# Patient Record
Sex: FEMALE | Race: BLACK OR AFRICAN AMERICAN | NOT HISPANIC OR LATINO | URBAN - METROPOLITAN AREA
[De-identification: names, ages, dates, MRNs, and addresses within clinical notes are randomized per-mention and may not be internally consistent; named-entity substitution may affect disease eponyms.]

---

## 2017-02-20 ENCOUNTER — EMERGENCY (EMERGENCY)
Facility: HOSPITAL | Age: 62
LOS: 1 days | Discharge: HOME | End: 2017-02-20
Attending: EMERGENCY MEDICINE
Payer: COMMERCIAL

## 2017-02-20 VITALS
SYSTOLIC BLOOD PRESSURE: 192 MMHG | WEIGHT: 293 LBS | HEART RATE: 95 BPM | OXYGEN SATURATION: 98 % | RESPIRATION RATE: 22 BRPM | DIASTOLIC BLOOD PRESSURE: 88 MMHG | HEIGHT: 65 IN | TEMPERATURE: 98 F

## 2017-02-20 DIAGNOSIS — Z88.8 ALLERGY STATUS TO OTHER DRUGS, MEDICAMENTS AND BIOLOGICAL SUBSTANCES STATUS: ICD-10-CM

## 2017-02-20 DIAGNOSIS — S93.401A SPRAIN OF UNSPECIFIED LIGAMENT OF RIGHT ANKLE, INITIAL ENCOUNTER: ICD-10-CM

## 2017-02-20 DIAGNOSIS — S83.401A SPRAIN OF UNSPECIFIED COLLATERAL LIGAMENT OF RIGHT KNEE, INITIAL ENCOUNTER: ICD-10-CM

## 2017-02-20 DIAGNOSIS — I10 ESSENTIAL (PRIMARY) HYPERTENSION: ICD-10-CM
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
## 2017-02-20 DIAGNOSIS — E11.9 TYPE 2 DIABETES MELLITUS WITHOUT COMPLICATIONS: ICD-10-CM

## 2017-02-20 PROCEDURE — 99284 EMERGENCY DEPT VISIT MOD MDM: CPT

## 2017-02-20 PROCEDURE — 73590 X-RAY EXAM OF LOWER LEG: CPT | Mod: 26,RT

## 2017-02-20 PROCEDURE — 73610 X-RAY EXAM OF ANKLE: CPT | Mod: 26,RT

## 2017-02-20 PROCEDURE — 73564 X-RAY EXAM KNEE 4 OR MORE: CPT | Mod: 26,RT

## 2017-02-20 RX ORDER — LIDOCAINE HCL 20 MG/ML
5 VIAL (ML) INJECTION ONCE
Qty: 0 | Refills: 0 | Status: COMPLETED | OUTPATIENT
Start: 2017-02-20 | End: 2017-02-20

## 2017-02-20 RX ORDER — KETOROLAC TROMETHAMINE 30 MG/ML
30 SYRINGE (ML) INJECTION ONCE
Qty: 0 | Refills: 0 | Status: DISCONTINUED | OUTPATIENT
Start: 2017-02-20 | End: 2017-02-20

## 2017-02-20 RX ORDER — ACETAMINOPHEN 500 MG
2 TABLET ORAL
Qty: 60 | Refills: 0 | OUTPATIENT
Start: 2017-02-20 | End: 2017-02-25

## 2017-02-20 RX ADMIN — Medication 5 MILLILITER(S): at 12:27

## 2017-02-20 RX ADMIN — Medication 30 MILLIGRAM(S): at 12:39

## 2017-02-20 RX ADMIN — Medication 80 MILLIGRAM(S): at 12:28

## 2017-02-20 RX ADMIN — Medication 30 MILLIGRAM(S): at 12:32

## 2017-02-20 NOTE — ED ADULT NURSE NOTE - OBJECTIVE STATEMENT
Presented to ED s/p fall from bed this morning and "hurt right leg." Complains of pain from knee down to ankle. AA&Ox3. Breathing on room air. Denies hitting head or LOC.

## 2017-02-20 NOTE — ED PROVIDER NOTE - PHYSICAL EXAMINATION
GENERAL: No acute distress, non toxic  HEAD: Atraumatic, normocephalic  EARS: Externally normal, atraumatic, TMs normal bilaterally  EYES: No jaundice, not injected, no rupture, no foreign bodies  MOUTH: Moist mucous membranes, no open lesion, uvula midline without edema, no exudates, no peritonsilar abscess bilaterally.  NECK: Supple, full range of motion, no swelling, no lymphadenopathy  HEART: Regular rate and rhythm, no murmurs, no rubs, no gallops  LUNGS: Clear to auscultation bilaterally without rhonci, rales, or wheezing  ABDOMEN: Soft and non tender in all 4 quadrants, normal bowel sounds, no signs of trauma, no costovertebral tenderness bilaterally  BACK/SPINE: Non tender spine in cervical/thoracic/lumbar regions, no stepoffs palpable  EXTREMITIES: No gross deformities; RLE decreased ROM at knee and ankle secondary to pain, pulses palpable;  no edema; no ecchymosis; no signs of trauma   VASCULAR: Pulses palpable in all extremities, no pitting edema, capillary refill <2 secs  SKIN: Grossly intact without rash or open wounds  PSYCH: Alert and oriented x 3  GAIT: Normal without need for assistance

## 2017-02-20 NOTE — ED PROVIDER NOTE - MEDICAL DECISION MAKING DETAILS
Pt with RLE pain s/p fall out of bed. Plan to obtain imaging, give pain medication and reassess. Pt with RLE pain s/p fall out of bed. Plan to obtain imaging, give pain medication and reassess.  XR neg for Fx. Saw Ortho in ED (see procedure and consult note). Pain improved. Knee immobilizer and ACE wrap was placed by Ortho and will f/u with Ortho outpt. Pain meds Rx PRN. Return for worsening symptoms.

## 2017-02-20 NOTE — ED PROVIDER NOTE - NS ED MD SCRIBE ATTENDING SCRIBE SECTIONS
VITAL SIGNS( Pullset)/PHYSICAL EXAM/HIV/PAST MEDICAL/SURGICAL/SOCIAL HISTORY/DISPOSITION/HISTORY OF PRESENT ILLNESS/REVIEW OF SYSTEMS

## 2017-02-20 NOTE — ED PROVIDER NOTE - CONDUCTED A DETAILED DISCUSSION WITH PATIENT AND/OR GUARDIAN REGARDING, MDM
return to ED if symptoms worsen, persist or questions arise need for outpatient follow-up/return to ED if symptoms worsen, persist or questions arise/radiology results

## 2017-02-20 NOTE — ED PROVIDER NOTE - OBJECTIVE STATEMENT
62 y/o F pt with PMHx of obesity, HTN and DM presents to the ED from home c/o R knee and ankle pain s/p fall from bed x today. Pt reports she is ambulatory at baseline. Pt states she heard a pop after she fell and was unable to bear weight. Pt's daughter had to help her get up. Pt took blood pressure medication PTA. Pt denies head trauma. LOC, CP, dizziness, SOB, paresthesia, or any other complaints at this time. Multiple Allergies please follow up arranged by [facility] referral coordinator. to chart. 62 y/o F pt with PMHx of obesity, HTN and DM presents to the ED from home c/o R knee and ankle pain s/p fall from bed x today. Pt reports she is ambulatory at baseline. Pt states she heard a pop after she fell and was unable to bear weight. Pt's daughter had to help her get up. Pt took blood pressure medication PTA. Pt denies head trauma. LOC, CP, dizziness, SOB, paresthesia, or any other complaints at this time. Has been well otherwise without URI symptoms, no dysuria/hematuria, urgency. No travel, no dizziness, no nausea, vomiting, diarrhea. Multiple Allergies.

## 2017-02-20 NOTE — ED ADULT NURSE NOTE - CAS EDN DISCHARGE ASSESSMENT
Symptoms improved/Alert and oriented to person, place and time/Awake/No adverse reaction to first time med in ED

## 2017-02-20 NOTE — ED PROVIDER NOTE - CARE PLAN
Principal Discharge DX:	Sprain of collateral ligament of right knee, initial encounter  Secondary Diagnosis:	Sprain of right ankle, unspecified ligament, initial encounter

## 2017-03-30 ENCOUNTER — RESULT REVIEW (OUTPATIENT)
Age: 62
End: 2017-03-30

## 2017-04-05 ENCOUNTER — APPOINTMENT (OUTPATIENT)
Dept: OBGYN | Facility: CLINIC | Age: 62
End: 2017-04-05

## 2017-04-12 ENCOUNTER — EMERGENCY (EMERGENCY)
Facility: HOSPITAL | Age: 62
LOS: 1 days | Discharge: HOME | End: 2017-04-12
Attending: EMERGENCY MEDICINE
Payer: COMMERCIAL

## 2017-04-12 VITALS
SYSTOLIC BLOOD PRESSURE: 130 MMHG | RESPIRATION RATE: 20 BRPM | HEART RATE: 87 BPM | DIASTOLIC BLOOD PRESSURE: 57 MMHG | TEMPERATURE: 98 F | HEIGHT: 63 IN | OXYGEN SATURATION: 97 % | WEIGHT: 293 LBS

## 2017-04-12 VITALS
OXYGEN SATURATION: 100 % | DIASTOLIC BLOOD PRESSURE: 53 MMHG | SYSTOLIC BLOOD PRESSURE: 135 MMHG | RESPIRATION RATE: 19 BRPM | TEMPERATURE: 98 F | HEART RATE: 71 BPM

## 2017-04-12 LAB
ALBUMIN SERPL ELPH-MCNC: 3.4 G/DL — LOW (ref 3.5–5)
ALP SERPL-CCNC: 92 U/L — SIGNIFICANT CHANGE UP (ref 40–120)
ALT FLD-CCNC: 37 U/L DA — SIGNIFICANT CHANGE UP (ref 10–60)
ANION GAP SERPL CALC-SCNC: 9 MMOL/L — SIGNIFICANT CHANGE UP (ref 5–17)
AST SERPL-CCNC: 32 U/L — SIGNIFICANT CHANGE UP (ref 10–40)
BASOPHILS # BLD AUTO: 0.1 K/UL — SIGNIFICANT CHANGE UP (ref 0–0.2)
BASOPHILS NFR BLD AUTO: 1.5 % — SIGNIFICANT CHANGE UP (ref 0–2)
BILIRUB SERPL-MCNC: 0.4 MG/DL — SIGNIFICANT CHANGE UP (ref 0.2–1.2)
BUN SERPL-MCNC: 17 MG/DL — SIGNIFICANT CHANGE UP (ref 7–18)
CALCIUM SERPL-MCNC: 9.1 MG/DL — SIGNIFICANT CHANGE UP (ref 8.4–10.5)
CHLORIDE SERPL-SCNC: 105 MMOL/L — SIGNIFICANT CHANGE UP (ref 96–108)
CO2 SERPL-SCNC: 27 MMOL/L — SIGNIFICANT CHANGE UP (ref 22–31)
CREAT SERPL-MCNC: 1.18 MG/DL — SIGNIFICANT CHANGE UP (ref 0.5–1.3)
CRP SERPL-MCNC: 1.8 MG/DL — HIGH (ref 0–0.4)
EOSINOPHIL # BLD AUTO: 0.2 K/UL — SIGNIFICANT CHANGE UP (ref 0–0.5)
EOSINOPHIL NFR BLD AUTO: 2.2 % — SIGNIFICANT CHANGE UP (ref 0–6)
ERYTHROCYTE [SEDIMENTATION RATE] IN BLOOD: 53 MM/HR — HIGH (ref 0–20)
GLUCOSE SERPL-MCNC: 121 MG/DL — HIGH (ref 70–99)
HCT VFR BLD CALC: 42 % — SIGNIFICANT CHANGE UP (ref 34.5–45)
HGB BLD-MCNC: 13.6 G/DL — SIGNIFICANT CHANGE UP (ref 11.5–15.5)
LYMPHOCYTES # BLD AUTO: 3 K/UL — SIGNIFICANT CHANGE UP (ref 1–3.3)
LYMPHOCYTES # BLD AUTO: 31.8 % — SIGNIFICANT CHANGE UP (ref 13–44)
MCHC RBC-ENTMCNC: 27.2 PG — SIGNIFICANT CHANGE UP (ref 27–34)
MCHC RBC-ENTMCNC: 32.3 GM/DL — SIGNIFICANT CHANGE UP (ref 32–36)
MCV RBC AUTO: 84.3 FL — SIGNIFICANT CHANGE UP (ref 80–100)
MONOCYTES # BLD AUTO: 0.6 K/UL — SIGNIFICANT CHANGE UP (ref 0–0.9)
MONOCYTES NFR BLD AUTO: 6.2 % — SIGNIFICANT CHANGE UP (ref 2–14)
NEUTROPHILS # BLD AUTO: 5.5 K/UL — SIGNIFICANT CHANGE UP (ref 1.8–7.4)
NEUTROPHILS NFR BLD AUTO: 58.3 % — SIGNIFICANT CHANGE UP (ref 43–77)
PLATELET # BLD AUTO: 268 K/UL — SIGNIFICANT CHANGE UP (ref 150–400)
POTASSIUM SERPL-MCNC: 4.3 MMOL/L — SIGNIFICANT CHANGE UP (ref 3.5–5.3)
POTASSIUM SERPL-SCNC: 4.3 MMOL/L — SIGNIFICANT CHANGE UP (ref 3.5–5.3)
PROT SERPL-MCNC: 8.9 G/DL — HIGH (ref 6–8.3)
RBC # BLD: 4.98 M/UL — SIGNIFICANT CHANGE UP (ref 3.8–5.2)
RBC # FLD: 14.9 % — HIGH (ref 10.3–14.5)
SODIUM SERPL-SCNC: 141 MMOL/L — SIGNIFICANT CHANGE UP (ref 135–145)
WBC # BLD: 9.5 K/UL — SIGNIFICANT CHANGE UP (ref 3.8–10.5)
WBC # FLD AUTO: 9.5 K/UL — SIGNIFICANT CHANGE UP (ref 3.8–10.5)

## 2017-04-12 PROCEDURE — 93970 EXTREMITY STUDY: CPT | Mod: 26

## 2017-04-12 PROCEDURE — 73610 X-RAY EXAM OF ANKLE: CPT | Mod: 26,RT

## 2017-04-12 PROCEDURE — 99284 EMERGENCY DEPT VISIT MOD MDM: CPT

## 2017-04-12 RX ORDER — ACETAMINOPHEN 500 MG
3 TABLET ORAL
Qty: 90 | Refills: 0 | OUTPATIENT
Start: 2017-04-12 | End: 2017-04-22

## 2017-04-12 RX ORDER — KETOROLAC TROMETHAMINE 30 MG/ML
15 SYRINGE (ML) INJECTION ONCE
Qty: 0 | Refills: 0 | Status: DISCONTINUED | OUTPATIENT
Start: 2017-04-12 | End: 2017-04-12

## 2017-04-12 RX ADMIN — Medication 100 MILLIGRAM(S): at 14:49

## 2017-04-12 RX ADMIN — Medication 15 MILLIGRAM(S): at 12:50

## 2017-04-12 RX ADMIN — Medication 15 MILLIGRAM(S): at 12:49

## 2017-04-12 NOTE — ED PROVIDER NOTE - MEDICAL DECISION MAKING DETAILS
pt p/w r lower extremity swelling, ttp, mild erythema. neg crepitus, pulses intact. pt us neg for dvt. xray neg for free air. instructed to f/u with ortho/podiatry for mri for possible bone spur vs abn finding on xray. pt adamantly refuses trauma to foot. understands need to f/u. pt stable for d/c. vss.

## 2017-04-12 NOTE — ED PROVIDER NOTE - OBJECTIVE STATEMENT
60 y/o F pt with no PMHx presents to the ED c/o BLE (R>L) swelling x days. Pt also notes erythema, pain and warmth. Pt denies fever, chills, nausea, vomiting, numbness or any other complaints at this time. Pt with multiple allergies.

## 2017-04-14 DIAGNOSIS — L03.115 CELLULITIS OF RIGHT LOWER LIMB: ICD-10-CM

## 2017-06-29 ENCOUNTER — RESULT REVIEW (OUTPATIENT)
Age: 62
End: 2017-06-29

## 2017-09-07 ENCOUNTER — RESULT REVIEW (OUTPATIENT)
Age: 62
End: 2017-09-07

## 2017-12-19 NOTE — ED ADULT NURSE NOTE - NEURO WDL
Patient called with condition update. She had a laparoscopic low anterior resection of the rectosigmoid colon for diverticulitis on 7/26/17 at [facility]. C/o on/off lower abdominal pain and diarrhea on Saturday 12/16/17. Denies any fever.  Patient sta Alert and oriented to person, place and time, memory intact, behavior appropriate to situation, PERRL.

## 2018-01-10 ENCOUNTER — OUTPATIENT (OUTPATIENT)
Dept: OUTPATIENT SERVICES | Facility: HOSPITAL | Age: 63
LOS: 1 days | End: 2018-01-10
Payer: COMMERCIAL

## 2018-01-10 VITALS
DIASTOLIC BLOOD PRESSURE: 77 MMHG | OXYGEN SATURATION: 97 % | WEIGHT: 293 LBS | TEMPERATURE: 98 F | HEIGHT: 65 IN | SYSTOLIC BLOOD PRESSURE: 136 MMHG | RESPIRATION RATE: 18 BRPM | HEART RATE: 76 BPM

## 2018-01-10 DIAGNOSIS — N84.0 POLYP OF CORPUS UTERI: ICD-10-CM

## 2018-01-10 DIAGNOSIS — N95.0 POSTMENOPAUSAL BLEEDING: ICD-10-CM

## 2018-01-10 DIAGNOSIS — J45.909 UNSPECIFIED ASTHMA, UNCOMPLICATED: ICD-10-CM

## 2018-01-10 DIAGNOSIS — E11.9 TYPE 2 DIABETES MELLITUS WITHOUT COMPLICATIONS: ICD-10-CM

## 2018-01-10 DIAGNOSIS — I10 ESSENTIAL (PRIMARY) HYPERTENSION: ICD-10-CM

## 2018-01-10 DIAGNOSIS — Z01.818 ENCOUNTER FOR OTHER PREPROCEDURAL EXAMINATION: ICD-10-CM

## 2018-01-10 LAB
ALBUMIN SERPL ELPH-MCNC: 3.7 G/DL — SIGNIFICANT CHANGE UP (ref 3.5–5)
ALP SERPL-CCNC: 94 U/L — SIGNIFICANT CHANGE UP (ref 40–120)
ALT FLD-CCNC: 39 U/L DA — SIGNIFICANT CHANGE UP (ref 10–60)
ANION GAP SERPL CALC-SCNC: 5 MMOL/L — SIGNIFICANT CHANGE UP (ref 5–17)
APPEARANCE UR: CLEAR — SIGNIFICANT CHANGE UP
APTT BLD: 30.4 SEC — SIGNIFICANT CHANGE UP (ref 27.5–37.4)
AST SERPL-CCNC: 17 U/L — SIGNIFICANT CHANGE UP (ref 10–40)
BACTERIA # UR AUTO: ABNORMAL /HPF
BILIRUB SERPL-MCNC: 0.4 MG/DL — SIGNIFICANT CHANGE UP (ref 0.2–1.2)
BILIRUB UR-MCNC: NEGATIVE — SIGNIFICANT CHANGE UP
BUN SERPL-MCNC: 11 MG/DL — SIGNIFICANT CHANGE UP (ref 7–18)
CALCIUM SERPL-MCNC: 9.7 MG/DL — SIGNIFICANT CHANGE UP (ref 8.4–10.5)
CHLORIDE SERPL-SCNC: 106 MMOL/L — SIGNIFICANT CHANGE UP (ref 96–108)
CO2 SERPL-SCNC: 35 MMOL/L — HIGH (ref 22–31)
COLOR SPEC: YELLOW — SIGNIFICANT CHANGE UP
CREAT SERPL-MCNC: 0.81 MG/DL — SIGNIFICANT CHANGE UP (ref 0.5–1.3)
DIFF PNL FLD: ABNORMAL
EPI CELLS # UR: SIGNIFICANT CHANGE UP /HPF
GLUCOSE SERPL-MCNC: 93 MG/DL — SIGNIFICANT CHANGE UP (ref 70–99)
GLUCOSE UR QL: NEGATIVE — SIGNIFICANT CHANGE UP
HCT VFR BLD CALC: 49.1 % — HIGH (ref 34.5–45)
HGB BLD-MCNC: 14.5 G/DL — SIGNIFICANT CHANGE UP (ref 11.5–15.5)
INR BLD: 1.06 RATIO — SIGNIFICANT CHANGE UP (ref 0.88–1.16)
KETONES UR-MCNC: NEGATIVE — SIGNIFICANT CHANGE UP
LEUKOCYTE ESTERASE UR-ACNC: NEGATIVE — SIGNIFICANT CHANGE UP
MCHC RBC-ENTMCNC: 25.8 PG — LOW (ref 27–34)
MCHC RBC-ENTMCNC: 29.6 GM/DL — LOW (ref 32–36)
MCV RBC AUTO: 87.3 FL — SIGNIFICANT CHANGE UP (ref 80–100)
NITRITE UR-MCNC: NEGATIVE — SIGNIFICANT CHANGE UP
PH UR: 8 — SIGNIFICANT CHANGE UP (ref 5–8)
PLATELET # BLD AUTO: 227 K/UL — SIGNIFICANT CHANGE UP (ref 150–400)
POTASSIUM SERPL-MCNC: 4 MMOL/L — SIGNIFICANT CHANGE UP (ref 3.5–5.3)
POTASSIUM SERPL-SCNC: 4 MMOL/L — SIGNIFICANT CHANGE UP (ref 3.5–5.3)
PROT SERPL-MCNC: 8.4 G/DL — HIGH (ref 6–8.3)
PROT UR-MCNC: NEGATIVE — SIGNIFICANT CHANGE UP
PROTHROM AB SERPL-ACNC: 11.6 SEC — SIGNIFICANT CHANGE UP (ref 9.8–12.7)
RBC # BLD: 5.62 M/UL — HIGH (ref 3.8–5.2)
RBC # FLD: 14.9 % — HIGH (ref 10.3–14.5)
RBC CASTS # UR COMP ASSIST: ABNORMAL /HPF (ref 0–2)
SODIUM SERPL-SCNC: 146 MMOL/L — HIGH (ref 135–145)
SP GR SPEC: 1.01 — SIGNIFICANT CHANGE UP (ref 1.01–1.02)
UROBILINOGEN FLD QL: NEGATIVE — SIGNIFICANT CHANGE UP
WBC # BLD: 10.2 K/UL — SIGNIFICANT CHANGE UP (ref 3.8–10.5)
WBC # FLD AUTO: 10.2 K/UL — SIGNIFICANT CHANGE UP (ref 3.8–10.5)
WBC UR QL: SIGNIFICANT CHANGE UP /HPF (ref 0–5)

## 2018-01-10 PROCEDURE — 85027 COMPLETE CBC AUTOMATED: CPT

## 2018-01-10 PROCEDURE — 71046 X-RAY EXAM CHEST 2 VIEWS: CPT | Mod: 26

## 2018-01-10 PROCEDURE — 85610 PROTHROMBIN TIME: CPT

## 2018-01-10 PROCEDURE — G0463: CPT

## 2018-01-10 PROCEDURE — 85730 THROMBOPLASTIN TIME PARTIAL: CPT

## 2018-01-10 PROCEDURE — 71046 X-RAY EXAM CHEST 2 VIEWS: CPT

## 2018-01-10 PROCEDURE — 80053 COMPREHEN METABOLIC PANEL: CPT

## 2018-01-10 PROCEDURE — 81001 URINALYSIS AUTO W/SCOPE: CPT

## 2018-01-10 RX ORDER — SODIUM CHLORIDE 9 MG/ML
3 INJECTION INTRAMUSCULAR; INTRAVENOUS; SUBCUTANEOUS EVERY 8 HOURS
Qty: 0 | Refills: 0 | Status: DISCONTINUED | OUTPATIENT
Start: 2018-01-22 | End: 2018-01-30

## 2018-01-10 NOTE — H&P PST ADULT - HISTORY OF PRESENT ILLNESS
62 yr old female with PMH of morbid obesity, asthma, seasonal allergies, HTN, DM, CHICA-does not use CPAP because she did not go for CPAP trial presents with c/o postmenopausal bleeding after years. Pt for hysteroscopy, dilation and curettage on 1/22/2018.

## 2018-01-10 NOTE — H&P PST ADULT - PROBLEM SELECTOR PLAN 1
hysteroscopy, dilation and curettage on 1/22/2018 hysteroscopy, dilation and curettage on 1/22/2018.

## 2018-01-10 NOTE — H&P PST ADULT - PSH
Cellulitis  h/o   both legs  H/O  section   &   History of tonsillectomy    No significant past surgical history    S/P colonoscopy  2013  S/P D&C  2012  Tubal ligation status

## 2018-01-10 NOTE — H&P PST ADULT - PROBLEM SELECTOR PLAN 3
Continue antidiabetic med and hold on day of surgery preop. Follow-up with PCP postop for diabetic management. BS monitoring periop and cover as needed.

## 2018-01-10 NOTE — H&P PST ADULT - RX
does not use CPAP CPAP 15 cm H2O- does not use CPAP, never tried CPAP CPAP 15 cm H2O recommended- does not use CPAP, never tried CPAP

## 2018-01-10 NOTE — H&P PST ADULT - NEGATIVE CARDIOVASCULAR SYMPTOMS
no dyspnea on exertion/no orthopnea/no claudication/no palpitations/no peripheral edema/no chest pain/no paroxysmal nocturnal dyspnea

## 2018-01-10 NOTE — H&P PST ADULT - RESPIRATORY AND THORAX COMMENTS
asthma due to URI-last attack 4 weeks ago asthma due to URI-last attack 4 weeks ago, CHICA-No use of CPAP

## 2018-01-10 NOTE — H&P PST ADULT - NEGATIVE ALLERGY TYPES
no outdoor environmental allergies/no indoor environmental allergies/no reactions to animals/no reactions to insect bites

## 2018-01-10 NOTE — H&P PST ADULT - PMH
Asthma    HTN (hypertension)    No pertinent past medical history    CHICA (obstructive sleep apnea)  "Advised to use machine & couldn't afford get one"  Postmenopausal bleeding    Uterine polyp Asthma    DM (diabetes mellitus)    HTN (hypertension)    CHICA (obstructive sleep apnea)  "Advised to use machine & couldn't afford get one"  Postmenopausal bleeding    Uterine polyp Asthma    Bilateral carpal tunnel syndrome    DM (diabetes mellitus)    HTN (hypertension)    Obesity, morbid    CHICA (obstructive sleep apnea)  "Advised to use machine & couldn't afford get one"  Postmenopausal bleeding    Seasonal allergies    Uterine polyp

## 2018-01-10 NOTE — H&P PST ADULT - RS GEN PE MLT RESP DETAILS PC
no rales/good air movement/no rhonchi/airway patent/no wheezes/no chest wall tenderness/normal/breath sounds equal/no subcutaneous emphysema/respirations non-labored/no intercostal retractions/clear to auscultation bilaterally

## 2018-01-10 NOTE — H&P PST ADULT - ASSESSMENT
62 yr old female with PMH of morbid obesity, asthma, seasonal allergies, HTN, DM, CHICA-does not use CPAP because she did not go for CPAP trial presents with postmenopausal bleeding. Pt for hysteroscopy, dilation and curettage on 1/22/2018. Pt is at moderate risk for procedure. 62 yr old female with PMH of morbid obesity, asthma, bilateral CTS, seasonal allergies, HTN, DM, CHICA-does not use CPAP because she did not go for CPAP trial presents with postmenopausal bleeding. Pt for hysteroscopy, dilation and curettage on 1/22/2018. Pt is at moderate risk for procedure.

## 2018-01-10 NOTE — H&P PST ADULT - PROBLEM SELECTOR PLAN 4
Continue use of inhaler prn and use on day of morning preop. Follow-up with PCP postop for asthma management

## 2018-01-10 NOTE — H&P PST ADULT - PROBLEM SELECTOR PLAN 5
Perioperative pulmonary precautions. Use CPAP in hospital. Follow recommendations of sleep medicine specialist.

## 2018-01-11 DIAGNOSIS — G47.33 OBSTRUCTIVE SLEEP APNEA (ADULT) (PEDIATRIC): ICD-10-CM

## 2018-01-22 ENCOUNTER — RESULT REVIEW (OUTPATIENT)
Age: 63
End: 2018-01-22

## 2018-01-22 ENCOUNTER — TRANSCRIPTION ENCOUNTER (OUTPATIENT)
Age: 63
End: 2018-01-22

## 2018-01-22 ENCOUNTER — OUTPATIENT (OUTPATIENT)
Dept: OUTPATIENT SERVICES | Facility: HOSPITAL | Age: 63
LOS: 1 days | End: 2018-01-22
Payer: COMMERCIAL

## 2018-01-22 VITALS
OXYGEN SATURATION: 100 % | SYSTOLIC BLOOD PRESSURE: 116 MMHG | TEMPERATURE: 98 F | DIASTOLIC BLOOD PRESSURE: 53 MMHG | HEART RATE: 71 BPM | RESPIRATION RATE: 15 BRPM

## 2018-01-22 VITALS
RESPIRATION RATE: 18 BRPM | HEIGHT: 65 IN | SYSTOLIC BLOOD PRESSURE: 127 MMHG | DIASTOLIC BLOOD PRESSURE: 56 MMHG | WEIGHT: 293 LBS | OXYGEN SATURATION: 96 % | TEMPERATURE: 98 F | HEART RATE: 73 BPM

## 2018-01-22 DIAGNOSIS — N84.0 POLYP OF CORPUS UTERI: ICD-10-CM

## 2018-01-22 DIAGNOSIS — Z01.818 ENCOUNTER FOR OTHER PREPROCEDURAL EXAMINATION: ICD-10-CM

## 2018-01-22 DIAGNOSIS — N95.0 POSTMENOPAUSAL BLEEDING: ICD-10-CM

## 2018-01-22 PROCEDURE — 88305 TISSUE EXAM BY PATHOLOGIST: CPT | Mod: 26

## 2018-01-22 PROCEDURE — 82962 GLUCOSE BLOOD TEST: CPT

## 2018-01-22 PROCEDURE — 58558 HYSTEROSCOPY BIOPSY: CPT

## 2018-01-22 RX ORDER — ONDANSETRON 8 MG/1
4 TABLET, FILM COATED ORAL ONCE
Qty: 0 | Refills: 0 | Status: DISCONTINUED | OUTPATIENT
Start: 2018-01-22 | End: 2018-01-23

## 2018-01-22 RX ORDER — IBUPROFEN 200 MG
600 TABLET ORAL EVERY 6 HOURS
Qty: 0 | Refills: 0 | Status: DISCONTINUED | OUTPATIENT
Start: 2018-01-22 | End: 2018-01-30

## 2018-01-22 RX ORDER — ACETAMINOPHEN 500 MG
2 TABLET ORAL
Qty: 0 | Refills: 0 | COMMUNITY

## 2018-01-22 RX ORDER — METFORMIN HYDROCHLORIDE 850 MG/1
1 TABLET ORAL
Qty: 0 | Refills: 0 | COMMUNITY

## 2018-01-22 RX ORDER — IBUPROFEN 200 MG
1 TABLET ORAL
Qty: 0 | Refills: 0 | COMMUNITY

## 2018-01-22 RX ORDER — NIFEDIPINE 30 MG
1 TABLET, EXTENDED RELEASE 24 HR ORAL
Qty: 0 | Refills: 0 | COMMUNITY

## 2018-01-22 RX ORDER — CETIRIZINE HYDROCHLORIDE 10 MG/1
1 TABLET ORAL
Qty: 0 | Refills: 0 | COMMUNITY

## 2018-01-22 RX ORDER — ALBUTEROL 90 UG/1
2 AEROSOL, METERED ORAL
Qty: 0 | Refills: 0 | COMMUNITY

## 2018-01-22 RX ORDER — HYDROMORPHONE HYDROCHLORIDE 2 MG/ML
0.5 INJECTION INTRAMUSCULAR; INTRAVENOUS; SUBCUTANEOUS
Qty: 0 | Refills: 0 | Status: DISCONTINUED | OUTPATIENT
Start: 2018-01-22 | End: 2018-01-23

## 2018-01-22 RX ORDER — METOPROLOL TARTRATE 50 MG
1 TABLET ORAL
Qty: 0 | Refills: 0 | COMMUNITY

## 2018-01-22 RX ORDER — TRIAMTERENE/HYDROCHLOROTHIAZID 75 MG-50MG
1 TABLET ORAL
Qty: 0 | Refills: 0 | COMMUNITY

## 2018-01-22 RX ORDER — LOSARTAN POTASSIUM 100 MG/1
1 TABLET, FILM COATED ORAL
Qty: 0 | Refills: 0 | COMMUNITY

## 2018-01-22 RX ADMIN — Medication 600 MILLIGRAM(S): at 18:30

## 2018-01-22 RX ADMIN — Medication 600 MILLIGRAM(S): at 18:03

## 2018-01-22 NOTE — BRIEF OPERATIVE NOTE - PROCEDURE
<<-----Click on this checkbox to enter Procedure D&C  01/22/2018  and hysteroscopy  Active  [email or web address]

## 2018-01-22 NOTE — ASU DISCHARGE PLAN (ADULT/PEDIATRIC). - ACTIVITY LEVEL
no douching/no sports/gym/nothing per rectum/no tampons/no intercourse/no exercise/no heavy lifting/nothing per vagina/no tub baths

## 2018-01-22 NOTE — ASU DISCHARGE PLAN (ADULT/PEDIATRIC). - MEDICATION SUMMARY - MEDICATIONS TO TAKE
I will START or STAY ON the medications listed below when I get home from the hospital:    ibuprofen 400 mg oral tablet  -- 1 tab(s) by mouth every 6 hours, As Needed  -- Indication: For as needed for pain

## 2018-01-22 NOTE — ASU PATIENT PROFILE, ADULT - PMH
Asthma    Bilateral carpal tunnel syndrome    DM (diabetes mellitus)    HTN (hypertension)    Obesity, morbid    CHICA (obstructive sleep apnea)  "Advised to use machine & couldn't afford get one"  Postmenopausal bleeding    Seasonal allergies    Uterine polyp

## 2018-01-22 NOTE — ASU DISCHARGE PLAN (ADULT/PEDIATRIC). - NOTIFY
Bleeding that does not stop/GYN Fever>100.4/Unable to Urinate/Increased Irritability or Sluggishness/Persistent Nausea and Vomiting/Inability to Tolerate Liquids or Foods/Pain not relieved by Medications

## 2018-01-24 LAB — SURGICAL PATHOLOGY FINAL REPORT - CH: SIGNIFICANT CHANGE UP

## 2019-03-14 ENCOUNTER — INPATIENT (INPATIENT)
Facility: HOSPITAL | Age: 64
LOS: 4 days | Discharge: HOME | DRG: 304 | End: 2019-03-19
Attending: HOSPITALIST | Admitting: HOSPITALIST
Payer: COMMERCIAL

## 2019-03-14 VITALS
SYSTOLIC BLOOD PRESSURE: 153 MMHG | TEMPERATURE: 98 F | OXYGEN SATURATION: 97 % | HEART RATE: 84 BPM | RESPIRATION RATE: 18 BRPM | WEIGHT: 293 LBS | DIASTOLIC BLOOD PRESSURE: 68 MMHG

## 2019-03-14 DIAGNOSIS — I10 ESSENTIAL (PRIMARY) HYPERTENSION: ICD-10-CM

## 2019-03-14 DIAGNOSIS — Z29.9 ENCOUNTER FOR PROPHYLACTIC MEASURES, UNSPECIFIED: ICD-10-CM

## 2019-03-14 DIAGNOSIS — I16.0 HYPERTENSIVE URGENCY: ICD-10-CM

## 2019-03-14 DIAGNOSIS — G47.33 OBSTRUCTIVE SLEEP APNEA (ADULT) (PEDIATRIC): ICD-10-CM

## 2019-03-14 DIAGNOSIS — E11.9 TYPE 2 DIABETES MELLITUS WITHOUT COMPLICATIONS: ICD-10-CM

## 2019-03-14 DIAGNOSIS — R07.9 CHEST PAIN, UNSPECIFIED: ICD-10-CM

## 2019-03-14 DIAGNOSIS — Z86.39 PERSONAL HISTORY OF OTHER ENDOCRINE, NUTRITIONAL AND METABOLIC DISEASE: Chronic | ICD-10-CM

## 2019-03-14 PROBLEM — G56.03 CARPAL TUNNEL SYNDROME, BILATERAL UPPER LIMBS: Chronic | Status: ACTIVE | Noted: 2018-01-11

## 2019-03-14 PROBLEM — E66.01 MORBID (SEVERE) OBESITY DUE TO EXCESS CALORIES: Chronic | Status: ACTIVE | Noted: 2018-01-11

## 2019-03-14 PROBLEM — J30.2 OTHER SEASONAL ALLERGIC RHINITIS: Chronic | Status: ACTIVE | Noted: 2018-01-11

## 2019-03-14 LAB
ALBUMIN SERPL ELPH-MCNC: 3.6 G/DL — SIGNIFICANT CHANGE UP (ref 3.5–5)
ALP SERPL-CCNC: 97 U/L — SIGNIFICANT CHANGE UP (ref 40–120)
ALT FLD-CCNC: 39 U/L DA — SIGNIFICANT CHANGE UP (ref 10–60)
ANION GAP SERPL CALC-SCNC: 5 MMOL/L — SIGNIFICANT CHANGE UP (ref 5–17)
APPEARANCE UR: CLEAR — SIGNIFICANT CHANGE UP
AST SERPL-CCNC: 21 U/L — SIGNIFICANT CHANGE UP (ref 10–40)
BASE EXCESS BLDV CALC-SCNC: 4.8 MMOL/L — HIGH (ref -2–2)
BASOPHILS # BLD AUTO: 0.04 K/UL — SIGNIFICANT CHANGE UP (ref 0–0.2)
BASOPHILS NFR BLD AUTO: 0.5 % — SIGNIFICANT CHANGE UP (ref 0–2)
BILIRUB SERPL-MCNC: 0.5 MG/DL — SIGNIFICANT CHANGE UP (ref 0.2–1.2)
BILIRUB UR-MCNC: NEGATIVE — SIGNIFICANT CHANGE UP
BLOOD GAS COMMENTS, VENOUS: SIGNIFICANT CHANGE UP
BUN SERPL-MCNC: 13 MG/DL — SIGNIFICANT CHANGE UP (ref 7–18)
CALCIUM SERPL-MCNC: 9.3 MG/DL — SIGNIFICANT CHANGE UP (ref 8.4–10.5)
CHLORIDE SERPL-SCNC: 102 MMOL/L — SIGNIFICANT CHANGE UP (ref 96–108)
CO2 SERPL-SCNC: 31 MMOL/L — SIGNIFICANT CHANGE UP (ref 22–31)
COLOR SPEC: YELLOW — SIGNIFICANT CHANGE UP
CREAT SERPL-MCNC: 0.84 MG/DL — SIGNIFICANT CHANGE UP (ref 0.5–1.3)
DIFF PNL FLD: ABNORMAL
EOSINOPHIL # BLD AUTO: 0.17 K/UL — SIGNIFICANT CHANGE UP (ref 0–0.5)
EOSINOPHIL NFR BLD AUTO: 2.1 % — SIGNIFICANT CHANGE UP (ref 0–6)
GLUCOSE SERPL-MCNC: 94 MG/DL — SIGNIFICANT CHANGE UP (ref 70–99)
GLUCOSE UR QL: NEGATIVE — SIGNIFICANT CHANGE UP
HCO3 BLDV-SCNC: 32 MMOL/L — HIGH (ref 21–29)
HCT VFR BLD CALC: 44.6 % — SIGNIFICANT CHANGE UP (ref 34.5–45)
HGB BLD-MCNC: 13.9 G/DL — SIGNIFICANT CHANGE UP (ref 11.5–15.5)
HOROWITZ INDEX BLDV+IHG-RTO: 21 — SIGNIFICANT CHANGE UP
IMM GRANULOCYTES NFR BLD AUTO: 0.4 % — SIGNIFICANT CHANGE UP (ref 0–1.5)
KETONES UR-MCNC: NEGATIVE — SIGNIFICANT CHANGE UP
LACTATE SERPL-SCNC: 1.3 MMOL/L — SIGNIFICANT CHANGE UP (ref 0.7–2)
LEUKOCYTE ESTERASE UR-ACNC: NEGATIVE — SIGNIFICANT CHANGE UP
LIDOCAIN IGE QN: 111 U/L — SIGNIFICANT CHANGE UP (ref 73–393)
LYMPHOCYTES # BLD AUTO: 2.79 K/UL — SIGNIFICANT CHANGE UP (ref 1–3.3)
LYMPHOCYTES # BLD AUTO: 34.4 % — SIGNIFICANT CHANGE UP (ref 13–44)
MCHC RBC-ENTMCNC: 26.2 PG — LOW (ref 27–34)
MCHC RBC-ENTMCNC: 31.2 GM/DL — LOW (ref 32–36)
MCV RBC AUTO: 84 FL — SIGNIFICANT CHANGE UP (ref 80–100)
MONOCYTES # BLD AUTO: 0.72 K/UL — SIGNIFICANT CHANGE UP (ref 0–0.9)
MONOCYTES NFR BLD AUTO: 8.9 % — SIGNIFICANT CHANGE UP (ref 2–14)
NEUTROPHILS # BLD AUTO: 4.36 K/UL — SIGNIFICANT CHANGE UP (ref 1.8–7.4)
NEUTROPHILS NFR BLD AUTO: 53.7 % — SIGNIFICANT CHANGE UP (ref 43–77)
NITRITE UR-MCNC: NEGATIVE — SIGNIFICANT CHANGE UP
NRBC # BLD: 0 /100 WBCS — SIGNIFICANT CHANGE UP (ref 0–0)
PCO2 BLDV: 57 MMHG — HIGH (ref 35–50)
PH BLDV: 7.36 — SIGNIFICANT CHANGE UP (ref 7.35–7.45)
PH UR: 6.5 — SIGNIFICANT CHANGE UP (ref 5–8)
PLATELET # BLD AUTO: 234 K/UL — SIGNIFICANT CHANGE UP (ref 150–400)
PO2 BLDV: 40 MMHG — SIGNIFICANT CHANGE UP (ref 25–45)
POTASSIUM SERPL-MCNC: 3.7 MMOL/L — SIGNIFICANT CHANGE UP (ref 3.5–5.3)
POTASSIUM SERPL-SCNC: 3.7 MMOL/L — SIGNIFICANT CHANGE UP (ref 3.5–5.3)
PROT SERPL-MCNC: 8.3 G/DL — SIGNIFICANT CHANGE UP (ref 6–8.3)
PROT UR-MCNC: NEGATIVE — SIGNIFICANT CHANGE UP
RBC # BLD: 5.31 M/UL — HIGH (ref 3.8–5.2)
RBC # FLD: 15.9 % — HIGH (ref 10.3–14.5)
SAO2 % BLDV: 68 % — SIGNIFICANT CHANGE UP (ref 67–88)
SODIUM SERPL-SCNC: 138 MMOL/L — SIGNIFICANT CHANGE UP (ref 135–145)
SP GR SPEC: 1.01 — SIGNIFICANT CHANGE UP (ref 1.01–1.02)
TROPONIN I SERPL-MCNC: 0.05 NG/ML — HIGH (ref 0–0.04)
UROBILINOGEN FLD QL: NEGATIVE — SIGNIFICANT CHANGE UP
WBC # BLD: 8.11 K/UL — SIGNIFICANT CHANGE UP (ref 3.8–10.5)
WBC # FLD AUTO: 8.11 K/UL — SIGNIFICANT CHANGE UP (ref 3.8–10.5)

## 2019-03-14 PROCEDURE — 99223 1ST HOSP IP/OBS HIGH 75: CPT

## 2019-03-14 PROCEDURE — 70450 CT HEAD/BRAIN W/O DYE: CPT | Mod: 26

## 2019-03-14 PROCEDURE — 74177 CT ABD & PELVIS W/CONTRAST: CPT | Mod: 26

## 2019-03-14 PROCEDURE — 99285 EMERGENCY DEPT VISIT HI MDM: CPT

## 2019-03-14 RX ORDER — ATORVASTATIN CALCIUM 80 MG/1
40 TABLET, FILM COATED ORAL AT BEDTIME
Qty: 0 | Refills: 0 | Status: DISCONTINUED | OUTPATIENT
Start: 2019-03-14 | End: 2019-03-19

## 2019-03-14 RX ORDER — ASPIRIN/CALCIUM CARB/MAGNESIUM 324 MG
81 TABLET ORAL DAILY
Qty: 0 | Refills: 0 | Status: DISCONTINUED | OUTPATIENT
Start: 2019-03-14 | End: 2019-03-19

## 2019-03-14 RX ORDER — INSULIN LISPRO 100/ML
VIAL (ML) SUBCUTANEOUS
Qty: 0 | Refills: 0 | Status: DISCONTINUED | OUTPATIENT
Start: 2019-03-14 | End: 2019-03-19

## 2019-03-14 RX ORDER — TRIAMTERENE/HYDROCHLOROTHIAZID 75 MG-50MG
1 TABLET ORAL DAILY
Qty: 0 | Refills: 0 | Status: DISCONTINUED | OUTPATIENT
Start: 2019-03-14 | End: 2019-03-15

## 2019-03-14 RX ORDER — IOHEXOL 300 MG/ML
30 INJECTION, SOLUTION INTRAVENOUS ONCE
Qty: 0 | Refills: 0 | Status: COMPLETED | OUTPATIENT
Start: 2019-03-14 | End: 2019-03-14

## 2019-03-14 RX ORDER — KETOROLAC TROMETHAMINE 30 MG/ML
30 SYRINGE (ML) INJECTION ONCE
Qty: 0 | Refills: 0 | Status: DISCONTINUED | OUTPATIENT
Start: 2019-03-14 | End: 2019-03-14

## 2019-03-14 RX ORDER — ASPIRIN/CALCIUM CARB/MAGNESIUM 324 MG
325 TABLET ORAL ONCE
Qty: 0 | Refills: 0 | Status: COMPLETED | OUTPATIENT
Start: 2019-03-14 | End: 2019-03-14

## 2019-03-14 RX ORDER — METOPROLOL TARTRATE 50 MG
25 TABLET ORAL
Qty: 0 | Refills: 0 | Status: DISCONTINUED | OUTPATIENT
Start: 2019-03-14 | End: 2019-03-19

## 2019-03-14 RX ORDER — IBUPROFEN 200 MG
1 TABLET ORAL
Qty: 0 | Refills: 0 | COMMUNITY

## 2019-03-14 RX ORDER — INSULIN LISPRO 100/ML
VIAL (ML) SUBCUTANEOUS AT BEDTIME
Qty: 0 | Refills: 0 | Status: DISCONTINUED | OUTPATIENT
Start: 2019-03-14 | End: 2019-03-19

## 2019-03-14 RX ORDER — LOSARTAN POTASSIUM 100 MG/1
100 TABLET, FILM COATED ORAL DAILY
Qty: 0 | Refills: 0 | Status: DISCONTINUED | OUTPATIENT
Start: 2019-03-14 | End: 2019-03-19

## 2019-03-14 RX ORDER — ENOXAPARIN SODIUM 100 MG/ML
40 INJECTION SUBCUTANEOUS DAILY
Qty: 0 | Refills: 0 | Status: DISCONTINUED | OUTPATIENT
Start: 2019-03-14 | End: 2019-03-19

## 2019-03-14 RX ADMIN — IOHEXOL 30 MILLILITER(S): 300 INJECTION, SOLUTION INTRAVENOUS at 18:14

## 2019-03-14 RX ADMIN — Medication 30 MILLIGRAM(S): at 20:32

## 2019-03-14 RX ADMIN — Medication 25 MILLIGRAM(S): at 23:25

## 2019-03-14 RX ADMIN — LOSARTAN POTASSIUM 100 MILLIGRAM(S): 100 TABLET, FILM COATED ORAL at 23:25

## 2019-03-14 RX ADMIN — Medication 325 MILLIGRAM(S): at 20:32

## 2019-03-14 RX ADMIN — Medication 30 MILLIGRAM(S): at 18:15

## 2019-03-14 NOTE — H&P ADULT - HISTORY OF PRESENT ILLNESS
Patient is a 63 year old female from home, with PMHx of  morbidly obese, Pre- diabetic, HTN, CHICA ( on Cpap but non compliant) presented to ED from PCP office with High BP. As per patient for last 3-4 days her SBP has been in 190's she went to see her PCP, advised her to go to hospital. Patient complains of chest discomfort, SOB, exertional dyspnea, b/l lower ext swelling and orthopnea. Patient also reports of right side abdominal pain radiating to right groin region associated with dysuria. Patient denies any h/o CVD or renal stones. Patient denies fever, chills, headaches, cough, nausea, vomiting, diarrhea, constipation, skin rashes, muscle or joint pains. Patient has been on multiple hypertensive medications, complains of allergies to BB, diuretics, CCB, lisinopril, currently pt was on losartan, nifedipine and HCTZ/ Triamterene. pt reports she has been non- compliant with medication due to side effects. Patient has CHICA on Cpap but she was not using at home.

## 2019-03-14 NOTE — ED PROVIDER NOTE - OBJECTIVE STATEMENT
64 y/o morbidly obese female with PMHx of HTN (pt on multiple meds for BP), DM presents to the ED c/o R sided upper and lower abd pain x 1 weeks. Pt notes intermittent abd pain. Pt was seen by GYN x yesterday, noted to have a high BP: 180s/100s. Pt seen today at PMD and had a BP reading similar to yesterday and referred to the ED. Pt's BP improved now on its own in ED but pt notes she has been having intermittent HA, numbness to vasu legs and hands. Pt denies fever, chills, nausea, vomiting, dysuria, or any other complaints. Other than 1 , pt with no prior abd surgeries. Pt with multiple allergies to meds.

## 2019-03-14 NOTE — H&P ADULT - ATTENDING COMMENTS
Vital Signs Last 24 Hrs  T(C): 36.5 (14 Mar 2019 19:32), Max: 36.8 (14 Mar 2019 15:54)  T(F): 97.7 (14 Mar 2019 19:32), Max: 98.3 (14 Mar 2019 15:54)  HR: 78 (14 Mar 2019 19:32) (78 - 84)  BP: 126/87 (14 Mar 2019 19:32) (126/87 - 153/68)  BP(mean): --  RR: 18 (14 Mar 2019 19:32) (18 - 18)  SpO2: 97% (14 Mar 2019 19:32) (97% - 97%) 63 year old woman with PMH of HTN, DM2, brought into the ED on account of 1 week of right sided abdominal pain. There was no associated nausea, vomiting, or diarrhea. The patient was seen in the ED and noted with headaches and high BP which resolved with pain control. OF note is her elevated cardiac enzymes.       Vital Signs Last 24 Hrs  T(C): 36.5 (14 Mar 2019 19:32), Max: 36.8 (14 Mar 2019 15:54)  T(F): 97.7 (14 Mar 2019 19:32), Max: 98.3 (14 Mar 2019 15:54)  HR: 78 (14 Mar 2019 19:32) (78 - 84)  BP: 126/87 (14 Mar 2019 19:32) (126/87 - 153/68)  BP(mean): --  RR: 18 (14 Mar 2019 19:32) (18 - 18)  SpO2: 97% (14 Mar 2019 19:32) (97% - 97%) 63 year old woman with PMH of HTN, DM2, brought into the ED on account of 1 week of right sided abdominal pain. There was no associated nausea, vomiting, or diarrhea. The patient was seen in the ED and also complained of intermittent headaches. Her BP was also noted to be high has improved with pain control. However she has an incidental trop elevation.  She does not have any chest pain, no SOB, no palpitations, no dizziness or any other symptoms    Vital Signs Last 24 Hrs  T(C): 36.5 (14 Mar 2019 19:32), Max: 36.8 (14 Mar 2019 15:54)  T(F): 97.7 (14 Mar 2019 19:32), Max: 98.3 (14 Mar 2019 15:54)  HR: 78 (14 Mar 2019 19:32) (78 - 84)  BP: 126/87 (14 Mar 2019 19:32) (126/87 - 153/68)  BP(mean): --  RR: 18 (14 Mar 2019 19:32) (18 - 18)  SpO2: 97% (14 Mar 2019 19:32) (97% - 97%)    Exam      Labs                        13.9   8.11  )-----------( 234      ( 14 Mar 2019 18:09 )             44.6     03-14    138  |  102  |  13  ----------------------------<  94  3.7   |  31  |  0.84    Ca    9.3      14 Mar 2019 18:09    T Pro  8.3  /  Alb  3.6  /  TBili  0.5  /  DBili  x   /  AST  21  /  ALT  39  /  AlkPhos  97  03-14    CARDIAC MARKERS ( 14 Mar 2019 18:09 )  0.054 ng/mL / x     / x     / x     / x        EKG - NSR , LAE    Impression  63 year old morbidly obese woman with PMH of DM2, HTN ( with mutiple intolerances to BP medsand concern about her complaince) admitted with abdominal pain and noted with elevated BP which resolved mostly with pain control with elevated troponin levels.  Will admit for ACS evaluation    Assessment  Uncontrolled HTN- counselling on compliance  ACS r/o- might be related to elevated BP( demand ischemia)  Morbid obesity  DM2    Plan  Admit to tele  Serial troponin  2D ECHO  Monitor BP; counseling on compliance  Cardiology consult if uptrending  or ECHO is abnormal  No further intervention if trop is downtrending 63 year old woman with PMH of HTN, DM2,obesity,  brought into the ED on account of 1 week of right sided abdominal pain. There was no associated nausea, vomiting, or diarrhea. The patient was seen in the ED and also complained of intermittent headaches. Her BP was also noted to be high has improved with pain control. However she has an incidental trop elevation.  ROS reveals CP, exercise intolerance and NICOLE.    Vital Signs Last 24 Hrs  T(C): 36.5 (14 Mar 2019 19:32), Max: 36.8 (14 Mar 2019 15:54)  T(F): 97.7 (14 Mar 2019 19:32), Max: 98.3 (14 Mar 2019 15:54)  HR: 78 (14 Mar 2019 19:32) (78 - 84)  BP: 126/87 (14 Mar 2019 19:32) (126/87 - 153/68)  BP(mean): --  RR: 18 (14 Mar 2019 19:32) (18 - 18)  SpO2: 97% (14 Mar 2019 19:32) (97% - 97%)    Exam  Middle aged woman, obese, NAD AAO X 3  No JVD  CTA B/L RRR S1S2  Full, distended but not tense, NT BS +  ++Pitting edema in both lower extremities    Labs                        13.9   8.11  )-----------( 234      ( 14 Mar 2019 18:09 )             44.6     03-14    138  |  102  |  13  ----------------------------<  94  3.7   |  31  |  0.84    Ca    9.3      14 Mar 2019 18:09    T Pro  8.3  /  Alb  3.6  /  TBili  0.5  /  DBili  x   /  AST  21  /  ALT  39  /  AlkPhos  97  03-14    CARDIAC MARKERS ( 14 Mar 2019 18:09 )  0.054 ng/mL / x     / x     / x     / x        EKG - NSR , LAE    Impression  63 year old morbidly obese woman with PMH of DM2, HTN ( with multiple intolerances to BP meds and concern about her compliance) noted with elevated BP on admission and clinical features c/w with ACS and possible undiagnosed CHF   Will admit for ACS evaluation and cardiac work up.    Assessment  Uncontrolled HTN- counselling on compliance  ACS r/o- might be related to elevated BP( demand ischemia)  CHF work up  Morbid obesity  DM2    Plan  Admit to tele  Serial troponin  2D ECHO  Monitor BP; counseling on compliance  Cardiology consult- Dr Miranda  Lipid panel/TSH/ A1c  ASA/BB/statin 63 year old woman with PMH of HTN, DM2,obesity,  brought into the ED on account of 1 week of right sided abdominal pain. There was no associated nausea, vomiting, or diarrhea. The patient was seen in the ED and also complained of intermittent headaches. Her BP was also noted to be high has improved with pain control. However she has an incidental trop elevation.  ROS reveals CP, exercise intolerance and NICOLE.    Vital Signs Last 24 Hrs  T(C): 36.5 (14 Mar 2019 19:32), Max: 36.8 (14 Mar 2019 15:54)  T(F): 97.7 (14 Mar 2019 19:32), Max: 98.3 (14 Mar 2019 15:54)  HR: 78 (14 Mar 2019 19:32) (78 - 84)  BP: 126/87 (14 Mar 2019 19:32) (126/87 - 153/68)  BP(mean): --  RR: 18 (14 Mar 2019 19:32) (18 - 18)  SpO2: 97% (14 Mar 2019 19:32) (97% - 97%)    Exam  Middle aged woman, obese, NAD AAO X 3  No JVD  CTA B/L RRR S1S2  Full, distended but not tense, NT BS +  ++Pitting edema in both lower extremities    Labs                        13.9   8.11  )-----------( 234      ( 14 Mar 2019 18:09 )             44.6     03-14    138  |  102  |  13  ----------------------------<  94  3.7   |  31  |  0.84    Ca    9.3      14 Mar 2019 18:09    T Pro  8.3  /  Alb  3.6  /  TBili  0.5  /  DBili  x   /  AST  21  /  ALT  39  /  AlkPhos  97  03-14    CARDIAC MARKERS ( 14 Mar 2019 18:09 )  0.054 ng/mL / x     / x     / x     / x        EKG - NSR , LAE    Impression  63 year old morbidly obese woman with PMH of DM2, HTN ( with multiple intolerances to BP meds and concern about her compliance) noted with elevated BP on admission and clinical features c/w with ACS and possible undiagnosed CHF   Will admit for ACS evaluation and cardiac work up.    Assessment  Uncontrolled HTN- counselling on compliance  ACS r/o- might be related to elevated BP( demand ischemia)  CHF work up  Morbid obesity  DM2    Plan  Admit to tele  Serial troponin  2D ECHO  Monitor BP; counseling on compliance; now on losartan; triamterene /HCTZ ;metoprolol  Would probably need a loop diuretic on her allergy list which appears to be more an intolerance.  Cardiology consult- Dr Miranda  Lipid panel/TSH/ A1c  ASA/BB/statin 63 year old woman with PMH of HTN, DM2,obesity,  brought into the ED on account of 1 week of right sided abdominal pain. There was no associated nausea, vomiting, or diarrhea. The patient was seen in the ED and also complained of intermittent headaches. Her BP was also noted to be high has improved with pain control. However she has an incidental trop elevation.  ROS reveals CP, exercise intolerance and NICOLE.    Vital Signs Last 24 Hrs  T(C): 36.5 (14 Mar 2019 19:32), Max: 36.8 (14 Mar 2019 15:54)  T(F): 97.7 (14 Mar 2019 19:32), Max: 98.3 (14 Mar 2019 15:54)  HR: 78 (14 Mar 2019 19:32) (78 - 84)  BP: 126/87 (14 Mar 2019 19:32) (126/87 - 153/68)  BP(mean): --  RR: 18 (14 Mar 2019 19:32) (18 - 18)  SpO2: 97% (14 Mar 2019 19:32) (97% - 97%)    Exam  Middle aged woman, obese, NAD AAO X 3  No JVD  CTA B/L RRR S1S2  Full, distended but not tense, NT BS +  ++Pitting edema in both lower extremities    Labs                        13.9   8.11  )-----------( 234      ( 14 Mar 2019 18:09 )             44.6     03-14    138  |  102  |  13  ----------------------------<  94  3.7   |  31  |  0.84    Ca    9.3      14 Mar 2019 18:09    T Pro  8.3  /  Alb  3.6  /  TBili  0.5  /  DBili  x   /  AST  21  /  ALT  39  /  AlkPhos  97  03-14    CARDIAC MARKERS ( 14 Mar 2019 18:09 )  0.054 ng/mL / x     / x     / x     / x        EKG - NSR , LAE    CT head  Crowding of the posterior fossa and particularly at the level of foramen   magnum, posterior fossa lesion or tonsillar ectopia cannot be excluded,   further correlation with MRI is suggested. No acute hemorrhage or CT   evidence of an acute territorial infarction.    ABDOMEN and PELVIS:    Intraperitoneal space: Normal. No free air. No significant fluid   collection.    Bones/joints:  Chronic degenerative changes of the lumbar spine.    Soft tissues: Unremarkable.    Vasculature:  Chronic atherosclerotic calcification of the vasculature.    Lymph nodes: Normal. No enlarged lymph nodes.     IMPRESSION:   1. No evidence of bowel obstruction.   2. Suspected fatty infiltration of the liver.    Impression  63 year old morbidly obese woman with PMH of DM2, HTN ( with multiple intolerances to BP meds and concern about her compliance) noted with elevated BP on admission and clinical features c/w with ACS and possible undiagnosed CHF   Will admit for ACS evaluation and cardiac work up.    Assessment  Uncontrolled HTN- counselling on compliance  ACS r/o- might be related to elevated BP( demand ischemia)  CHF work up  Morbid obesity  DM2    Plan  Admit to tele  Serial troponin  2D ECHO  Monitor BP; counseling on compliance; now on losartan; triamterene /HCTZ ;metoprolol  Would probably need a loop diuretic on her allergy list which appears to be more an intolerance.  Cardiology consult- Dr Miranda  Lipid panel/TSH/ A1c  ASA/BB/statin  ** Incidental finding - CT head with suggestion for am MRI head

## 2019-03-14 NOTE — H&P ADULT - MUSCULOSKELETAL
detailed exam no joint swelling/ROM intact/normal strength/no joint warmth/no calf tenderness/no joint erythema

## 2019-03-14 NOTE — H&P ADULT - ASSESSMENT
Patient is a 63 year old female from home, with PMHx of  morbidly obese, Pre- diabetic, HTN, CHICA ( on Cpap but non compliant) presented to ED from PCP office with High BP. As per patient for last 3-4 days her SBP has been in 190's she went to see her PCP, advised her to go to hospital. Patient complains of chest discomfort, SOB, exertional dyspnea, b/l lower ext swelling and orthopnea. Patient also reports of right side abdominal pain radiating to right groin region associated with dysuria. Patient denies any h/o CVD or renal stones. Patient denies fever, chills, headaches, cough, nausea, vomiting, diarrhea, constipation, skin rashes, muscle or joint pains. Patient has been on multiple hypertensive medications, complains of allergies to BB, diuretics, CCB, lisinopril, currently pt was on losartan, nifedipine and HCTZ/ Triamterene. pt reports she has been non- compliant with medication due to side effects. Patient has CHICA on Cpap but she was not using at home.         Chest pain [R07.9]  Patient presented with chest pain, sob, exertional dyspnea   on admission pt was afebrile, /68, Hr 84, RR 18, no leucocytosis, normal electrolytes and renal functions  CXR no congestion or consolidation   - CT head no acute pathology   - UA neg  - EKG   - Trop 0.03  - Mark score 2 pt has 8% mortalilty risk   - will admit to ACS r/o   -continue with aspirin, statin and BB  - trend trop  - F/u BNP, TSH, A1c adn lipid panel   - f/u ECHO   - Cardioolgy consutl Dr Miranda      DM (diabetes mellitus) [E11.9]  As per patient she is pre- diabetic   last A1c 6.4%  - sliding scale  - diabetic diet   - f/u A1c     Hypertension [I10]  continue with metorpolol. losartan HCTZ  untrolloloed HTN likely due to non -compliance and CHICA     CHICA on CPAP [G47.33]  bakari Cpap    Prophylactic measure [Z29.9]  lovenox for Dvt prohylaxis     CHICA (obstructive sleep apnea) [327.23]  "Advised to use machine & couldn't afford get one"    FH: type 2 diabetes [Z83.3]    FH: HTN (hypertension) [Z82.49] Patient is a 63 year old female from home, with PMHx of  morbidly obese, Pre- diabetic, HTN, CHICA ( on Cpap but non compliant) presented to ED from PCP office with High BP. As per patient for last 3-4 days her SBP has been in 190's she went to see her PCP, advised her to go to hospital. Patient complains of chest discomfort, SOB, exertional dyspnea, b/l lower ext swelling and orthopnea. Patient also reports of right side abdominal pain radiating to right groin region associated with dysuria. Patient denies any h/o CVD or renal stones. Patient denies fever, chills, headaches, cough, nausea, vomiting, diarrhea, constipation, skin rashes, muscle or joint pains. Patient has been on multiple hypertensive medications, complains of allergies to BB, diuretics, CCB, lisinopril, currently pt was on losartan, nifedipine and HCTZ/ Triamterene. pt reports she has been non- compliant with medication due to side effects. Patient has CHICA on Cpap but she was not using at home.         Chest pain [R07.9]  Patient presented with chest pain, sob, exertional dyspnea   on admission pt was afebrile, /68, Hr 84, RR 18, no leucocytosis, normal electrolytes and renal functions  CXR no congestion or consolidation   - CT head no acute pathology   - UA neg  - EKG NSR with no st t wave changes   - Trop 0.05  - Mark score 2 pt has 8% mortalilty risk   - will admit to ACS r/o   -continue with aspirin, statin and BB  - trend trop  - F/u BNP, TSH, A1c adn lipid panel   - f/u ECHO   - Cardioolgy consutl Dr Miranda      DM (diabetes mellitus) [E11.9]  As per patient she is pre- diabetic   last A1c 6.4%  - sliding scale  - diabetic diet   - f/u A1c     Hypertension [I10]  continue with metorpolol. losartan HCTZ  untrolloloed HTN likely due to non -compliance and CHICA     CHICA on CPAP [G47.33]  bakari Cpap    Prophylactic measure [Z29.9]  lovenox for Dvt prohylaxis     CHICA (obstructive sleep apnea) [327.23]  "Advised to use machine & couldn't afford get one"    FH: type 2 diabetes [Z83.3]    FH: HTN (hypertension) [Z82.49]

## 2019-03-14 NOTE — H&P ADULT - NSICDXPROBLEM_GEN_ALL_CORE_FT
PROBLEM DIAGNOSES  Problem: Chest pain  Assessment and Plan: Patient presented with chest pain, sob, exertional dyspnea       Problem: DM (diabetes mellitus)  Assessment and Plan:     Problem: Hypertension  Assessment and Plan:     Problem: CHICA on CPAP  Assessment and Plan:     Problem: Prophylactic measure  Assessment and Plan:

## 2019-03-14 NOTE — ED ADULT TRIAGE NOTE - CHIEF COMPLAINT QUOTE
elevated blood pressure, shortness of breath and headache. patient also complains of leg numbness x 1 week

## 2019-03-14 NOTE — ED PROVIDER NOTE - CLINICAL SUMMARY MEDICAL DECISION MAKING FREE TEXT BOX
ha/cp/bilateral hand numbness -- tele, ecg, labs, trop, cxr - possible htn related, given so many med allergies/intolerance, will treat htn and rpt bp/trend. not very high now  on and off abd pain, right upper and lower, pt morbidly obese, nontender on exam but limited exam so will do ctap along with labs to further eval  likely admit  see progress note

## 2019-03-14 NOTE — H&P ADULT - NSHPPHYSICALEXAM_GEN_ALL_CORE
Vital Signs Last 24 Hrs  T(C): 36.5 (14 Mar 2019 19:32), Max: 36.8 (14 Mar 2019 15:54)  T(F): 97.7 (14 Mar 2019 19:32), Max: 98.3 (14 Mar 2019 15:54)  HR: 79 (14 Mar 2019 22:48) (78 - 84)  BP: 137/70 (14 Mar 2019 22:48) (126/87 - 153/68)  BP(mean): --  RR: 17 (14 Mar 2019 22:48) (17 - 18)  SpO2: 97% (14 Mar 2019 22:48) (97% - 97%)

## 2019-03-14 NOTE — H&P ADULT - NSICDXPASTSURGICALHX_GEN_ALL_CORE_FT
PAST SURGICAL HISTORY:  Cellulitis h/o   both legs    H/O  section  &     History of tonsillectomy     S/P colonoscopy 2013    S/P D&C 2012    Tubal ligation status

## 2019-03-14 NOTE — H&P ADULT - NSICDXPASTMEDICALHX_GEN_ALL_CORE_FT
PAST MEDICAL HISTORY:  Asthma     Bilateral carpal tunnel syndrome     DM (diabetes mellitus)     HTN (hypertension)     Obesity, morbid     CHICA (obstructive sleep apnea) "Advised to use machine & couldn't afford get one"    Postmenopausal bleeding     Seasonal allergies     Uterine polyp

## 2019-03-15 LAB
ANION GAP SERPL CALC-SCNC: 6 MMOL/L — SIGNIFICANT CHANGE UP (ref 5–17)
BASOPHILS # BLD AUTO: 0.04 K/UL — SIGNIFICANT CHANGE UP (ref 0–0.2)
BASOPHILS NFR BLD AUTO: 0.5 % — SIGNIFICANT CHANGE UP (ref 0–2)
BUN SERPL-MCNC: 15 MG/DL — SIGNIFICANT CHANGE UP (ref 7–18)
CALCIUM SERPL-MCNC: 8.7 MG/DL — SIGNIFICANT CHANGE UP (ref 8.4–10.5)
CHLORIDE SERPL-SCNC: 101 MMOL/L — SIGNIFICANT CHANGE UP (ref 96–108)
CHOLEST SERPL-MCNC: 111 MG/DL — SIGNIFICANT CHANGE UP (ref 10–199)
CK MB BLD-MCNC: 0.8 % — SIGNIFICANT CHANGE UP (ref 0–3.5)
CK MB BLD-MCNC: <0.6 % — SIGNIFICANT CHANGE UP (ref 0–3.5)
CK MB CFR SERPL CALC: 1.2 NG/ML — SIGNIFICANT CHANGE UP (ref 0–3.6)
CK MB CFR SERPL CALC: <1 NG/ML — SIGNIFICANT CHANGE UP (ref 0–3.6)
CK SERPL-CCNC: 150 U/L — SIGNIFICANT CHANGE UP (ref 21–215)
CK SERPL-CCNC: 155 U/L — SIGNIFICANT CHANGE UP (ref 21–215)
CO2 SERPL-SCNC: 32 MMOL/L — HIGH (ref 22–31)
CREAT SERPL-MCNC: 0.91 MG/DL — SIGNIFICANT CHANGE UP (ref 0.5–1.3)
EOSINOPHIL # BLD AUTO: 0.22 K/UL — SIGNIFICANT CHANGE UP (ref 0–0.5)
EOSINOPHIL NFR BLD AUTO: 2.8 % — SIGNIFICANT CHANGE UP (ref 0–6)
GLUCOSE SERPL-MCNC: 104 MG/DL — HIGH (ref 70–99)
HBA1C BLD-MCNC: 6.3 % — HIGH (ref 4–5.6)
HCT VFR BLD CALC: 42.9 % — SIGNIFICANT CHANGE UP (ref 34.5–45)
HDLC SERPL-MCNC: 63 MG/DL — SIGNIFICANT CHANGE UP
HGB BLD-MCNC: 13.5 G/DL — SIGNIFICANT CHANGE UP (ref 11.5–15.5)
IMM GRANULOCYTES NFR BLD AUTO: 0.3 % — SIGNIFICANT CHANGE UP (ref 0–1.5)
LIPID PNL WITH DIRECT LDL SERPL: 29 MG/DL — SIGNIFICANT CHANGE UP
LYMPHOCYTES # BLD AUTO: 3.13 K/UL — SIGNIFICANT CHANGE UP (ref 1–3.3)
LYMPHOCYTES # BLD AUTO: 40.5 % — SIGNIFICANT CHANGE UP (ref 13–44)
MAGNESIUM SERPL-MCNC: 2.5 MG/DL — SIGNIFICANT CHANGE UP (ref 1.6–2.6)
MCHC RBC-ENTMCNC: 26.3 PG — LOW (ref 27–34)
MCHC RBC-ENTMCNC: 31.5 GM/DL — LOW (ref 32–36)
MCV RBC AUTO: 83.6 FL — SIGNIFICANT CHANGE UP (ref 80–100)
MONOCYTES # BLD AUTO: 0.74 K/UL — SIGNIFICANT CHANGE UP (ref 0–0.9)
MONOCYTES NFR BLD AUTO: 9.6 % — SIGNIFICANT CHANGE UP (ref 2–14)
NEUTROPHILS # BLD AUTO: 3.58 K/UL — SIGNIFICANT CHANGE UP (ref 1.8–7.4)
NEUTROPHILS NFR BLD AUTO: 46.3 % — SIGNIFICANT CHANGE UP (ref 43–77)
NRBC # BLD: 0 /100 WBCS — SIGNIFICANT CHANGE UP (ref 0–0)
NT-PROBNP SERPL-SCNC: 8 PG/ML — SIGNIFICANT CHANGE UP (ref 0–125)
PHOSPHATE SERPL-MCNC: 4.1 MG/DL — SIGNIFICANT CHANGE UP (ref 2.5–4.5)
PLATELET # BLD AUTO: 237 K/UL — SIGNIFICANT CHANGE UP (ref 150–400)
POTASSIUM SERPL-MCNC: 3.7 MMOL/L — SIGNIFICANT CHANGE UP (ref 3.5–5.3)
POTASSIUM SERPL-SCNC: 3.7 MMOL/L — SIGNIFICANT CHANGE UP (ref 3.5–5.3)
RBC # BLD: 5.13 M/UL — SIGNIFICANT CHANGE UP (ref 3.8–5.2)
RBC # FLD: 15.9 % — HIGH (ref 10.3–14.5)
SODIUM SERPL-SCNC: 139 MMOL/L — SIGNIFICANT CHANGE UP (ref 135–145)
TOTAL CHOLESTEROL/HDL RATIO MEASUREMENT: 1.8 RATIO — LOW (ref 3.3–7.1)
TRIGL SERPL-MCNC: 95 MG/DL — SIGNIFICANT CHANGE UP (ref 10–149)
TROPONIN I SERPL-MCNC: 0.06 NG/ML — HIGH (ref 0–0.04)
TROPONIN I SERPL-MCNC: 0.06 NG/ML — HIGH (ref 0–0.04)
TSH SERPL-MCNC: 2.14 UU/ML — SIGNIFICANT CHANGE UP (ref 0.34–4.82)
WBC # BLD: 7.73 K/UL — SIGNIFICANT CHANGE UP (ref 3.8–10.5)
WBC # FLD AUTO: 7.73 K/UL — SIGNIFICANT CHANGE UP (ref 3.8–10.5)

## 2019-03-15 PROCEDURE — 99233 SBSQ HOSP IP/OBS HIGH 50: CPT | Mod: GC

## 2019-03-15 PROCEDURE — 93306 TTE W/DOPPLER COMPLETE: CPT | Mod: 26

## 2019-03-15 PROCEDURE — 99223 1ST HOSP IP/OBS HIGH 75: CPT

## 2019-03-15 RX ORDER — HYDRALAZINE HCL 50 MG
10 TABLET ORAL ONCE
Qty: 0 | Refills: 0 | Status: COMPLETED | OUTPATIENT
Start: 2019-03-15 | End: 2019-03-15

## 2019-03-15 RX ORDER — TRIAMTERENE/HYDROCHLOROTHIAZID 75 MG-50MG
2 TABLET ORAL DAILY
Qty: 0 | Refills: 0 | Status: DISCONTINUED | OUTPATIENT
Start: 2019-03-15 | End: 2019-03-19

## 2019-03-15 RX ADMIN — Medication 25 MILLIGRAM(S): at 17:39

## 2019-03-15 RX ADMIN — Medication 2 CAPSULE(S): at 08:45

## 2019-03-15 RX ADMIN — Medication 10 MILLIGRAM(S): at 02:30

## 2019-03-15 RX ADMIN — Medication 25 MILLIGRAM(S): at 05:55

## 2019-03-15 RX ADMIN — ATORVASTATIN CALCIUM 40 MILLIGRAM(S): 80 TABLET, FILM COATED ORAL at 23:17

## 2019-03-15 RX ADMIN — ENOXAPARIN SODIUM 40 MILLIGRAM(S): 100 INJECTION SUBCUTANEOUS at 12:37

## 2019-03-15 RX ADMIN — Medication 81 MILLIGRAM(S): at 12:37

## 2019-03-15 NOTE — CONSULT NOTE ADULT - SUBJECTIVE AND OBJECTIVE BOX
Patient seen and evaluated at bedside    Chief Complaint: sob, chest pain and right sided abdominal pain    HPI:  Patient is a 63 year old female from home, with PMHx of  morbidly obese, Pre- diabetic, HTN, CHICA ( on Cpap but non compliant) presented to ED from PCP office with High BP. As per patient for last 3-4 days her SBP has been in 190's she went to see her PCP, advised her to go to hospital. Patient complains of chest discomfort, SOB, exertional dyspnea, b/l lower ext swelling and orthopnea. Patient also reports of right side abdominal pain radiating to right groin region associated with dysuria. Patient denies any h/o CVD or renal stones. Patient denies fever, chills, headaches, cough, nausea, vomiting, diarrhea, constipation, skin rashes, muscle or joint pains. Patient has been on multiple hypertensive medications, complains of allergies to BB, diuretics, CCB, lisinopril, currently pt was on losartan, nifedipine and HCTZ/ Triamterene. Pt reports she has been non- compliant with medication due to side effects. Patient has CHICA on Cpap but she was not using at home. (14 Mar 2019 20:47)      PMHx:   Obesity, morbid  Seasonal allergies  Bilateral carpal tunnel syndrome  DM (diabetes mellitus)  No pertinent past medical history  Uterine polyp  Postmenopausal bleeding  Asthma  HTN (hypertension)  CHICA (obstructive sleep apnea)      PSHx:   H/O diabetes mellitus  No significant past surgical history  Tubal ligation status  Cellulitis  S/P colonoscopy  S/P D&C  H/O  section  History of tonsillectomy      Allergies:  amlodipine (Short breath)  chlorthalidone (Anaphylaxis)  clonidine (Anaphylaxis)  diltiazem (Anaphylaxis)  furosemide (Short breath)  Lasix (Pruritus)  lisinopril (Anaphylaxis)  nebivolol (Anaphylaxis)  shellfish (Hives)      Home Meds:    Current Medications:   aspirin  chewable 81 milliGRAM(s) Oral daily  atorvastatin 40 milliGRAM(s) Oral at bedtime  enoxaparin Injectable 40 milliGRAM(s) SubCutaneous daily  insulin lispro (HumaLOG) corrective regimen sliding scale   SubCutaneous three times a day before meals  insulin lispro (HumaLOG) corrective regimen sliding scale   SubCutaneous at bedtime  losartan 100 milliGRAM(s) Oral daily  metoprolol tartrate 25 milliGRAM(s) Oral two times a day  triamterene 37.5 mG/hydrochlorothiazide 25 mG Capsule 2 Capsule(s) Oral daily      FAMILY HISTORY:  FH: type 2 diabetes  FH: HTN (hypertension)  Asthma      Social History:  Smoking History: Not a smoker  Alcohol Use:  Drug Use:    REVIEW OF SYSTEMS:  Constitutional:     [ x] negative [ ] fevers [ ] chills [ ] weight loss [ ] weight gain  HEENT:                  [x ] negative [ ] dry eyes [ ] eye irritation [ ] postnasal drip [ ] nasal congestion  CV:                         [x ] negative  [ ] chest pain [ ] orthopnea [ ] palpitations [ ] murmur  Resp:                     [ x] negative [ ] cough [ ] shortness of breath [ ] dyspnea [ ] wheezing [ ] sputum [ ]hemoptysis  GI:                          [x ] negative [ ] nausea [ ] vomiting [ ] diarrhea [ ] constipation [ ] abd pain [ ] dysphagia   :                        [ x] negative [ ] dysuria [ ] nocturia [ ] hematuria [ ] increased urinary frequency  Musculoskeletal: [ x] negative [ ] back pain [ ] myalgias [ ] arthralgias [ ] fracture  Skin:                       [x ] negative [ ] rash [ ] itch  Neurological:        [x ] negative [ ] headache [ ] dizziness [ ] syncope [ ] weakness [ ] numbness  Psychiatric:           [x ] negative [ ] anxiety [ ] depression    [ x] All other systems negative  [ ] Unable to assess ROS because sedated with anoxic brain injury.      Physical Exam:  T(F): 98 (-15), Max: 98.3 (-14)  HR: 72 (-15) (72 - 84)  BP: 142/55 (-15) (126/87 - 175/68)  RR: 18 (-15)  SpO2: 96% (-15)    GENERAL: No acute distress, well-developed, obese  HEAD:  Atraumatic, Normocephalic  ENT: EOMI, PERRLA, conjunctiva and sclera clear, Neck supple, No JVD, moist mucosa  CHEST/LUNG: Clear to auscultation bilaterally; No wheeze, equal breath sounds bilaterally   BACK: No spinal tenderness  HEART: Regular rate and rhythm; No murmurs, rubs, or gallops  ABDOMEN: Soft, Nontender, Nondistended; Bowel sounds present  EXTREMITIES:  No clubbing, cyanosis, or edema  PSYCH: Nl behavior, nl affect  NEUROLOGY: AAOx3, non-focal, cranial nerves intact  SKIN: Normal color, No rashes or lesions      Cardiovascular Diagnostic Testing:    ECG: Personally reviewed: Yes, normal    Echo: Personally reviewed: pending    Stress Testing:    Cath:    Imaging:    CXR: Personally reviewed  < from: CT Abdomen and Pelvis w/ Oral Cont and w/ IV Cont (19 @ 22:23) >  Impression: Mild hepatic steatosis.    No bowel obstruction or grossly thickened bowel wall. Colonic   diverticulosis without evidence for diverticulitis. The appendix is not   visualized. No secondary signs for acute appendicitis.    Nonspecific mildly enlarged 1.2 cm left external iliac chain lymph node.    < end of copied text >      Labs: Personally reviewed                        13.5   7.73  )-----------( 237      ( 15 Mar 2019 06:36 )             42.9     03-15    139  |  101  |  15  ----------------------------<  104<H>  3.7   |  32<H>  |  0.91    Ca    8.7      15 Mar 2019 06:36  Phos  4.1     03-15  Mg     2.5     03-15    TPro  8.3  /  Alb  3.6  /  TBili  0.5  /  DBili  x   /  AST  21  /  ALT  39  /  AlkPhos  97        Serum Pro-Brain Natriuretic Peptide: 8 pg/mL (03-15 @ 06:36)    Total Cholesterol: 111  LDL: 29  HDL: 63  T      Thyroid Stimulating Hormone, Serum: 2.14 uU/mL (03-15 @ 06:36)

## 2019-03-15 NOTE — CONSULT NOTE ADULT - SUBJECTIVE AND OBJECTIVE BOX
Patient has intermittent numbness in both hands for many years, and has been told she has carpal tunnel syndrome.  Patient says she saw a neurologist in [place] 7-8 years ago, who let her know about having "sinking" in the back of her head.  Exam: Morbidly obese, Normal neuro exam (including no sensory deficit to LT & PP in b/l hands)  The findings on the patient's CT Head are likely chronic, and do not need further inpatient neuro workup.  Patient can f/u in Neuro clinic ([place] or [place]) for long-term management of Chiari malformation and b/l hand numbness, which may be related.      NOTE TO BE COMPLETED    Neurology Consult    Patient is a 63y old  Female who presents with a chief complaint of sob, chest pain and right sided abdominal pain (15 Mar 2019 14:05)      HPI:  Patient is a 63 year old female from home, with PMHx of  morbidly obese, Pre- diabetic, HTN, CHICA ( on Cpap but non compliant) presented to ED from PCP office with High BP. As per patient for last 3-4 days her SBP has been in 190's she went to see her PCP, advised her to go to hospital. Patient complains of chest discomfort, SOB, exertional dyspnea, b/l lower ext swelling and orthopnea. Patient also reports of right side abdominal pain radiating to right groin region associated with dysuria. Patient denies any h/o CVD or renal stones. Patient denies fever, chills, headaches, cough, nausea, vomiting, diarrhea, constipation, skin rashes, muscle or joint pains. Patient has been on multiple hypertensive medications, complains of allergies to BB, diuretics, CCB, lisinopril, currently pt was on losartan, nifedipine and HCTZ/ Triamterene. pt reports she has been non- compliant with medication due to side effects. Patient has HCICA on Cpap but she was not using at home. (14 Mar 2019 20:47)      PAST MEDICAL & SURGICAL HISTORY:  Obesity, morbid  Seasonal allergies  Bilateral carpal tunnel syndrome  DM (diabetes mellitus)  Uterine polyp  Postmenopausal bleeding  Asthma  HTN (hypertension)  CHICA (obstructive sleep apnea): &quot;Advised to use machine &amp; couldn&#x27;t afford get one&quot;  Tubal ligation status  Cellulitis: h/o   both legs  S/P colonoscopy: 2013  S/P D&C: 2012  H/O  section:  &amp;   History of tonsillectomy      FAMILY HISTORY:  FH: type 2 diabetes  FH: HTN (hypertension)  Asthma      Social History: (-) x 3    Allergies    amlodipine (Short breath)  chlorthalidone (Anaphylaxis)  clonidine (Anaphylaxis)  diltiazem (Anaphylaxis)  furosemide (Short breath)  Lasix (Pruritus)  lisinopril (Anaphylaxis)  nebivolol (Anaphylaxis)  shellfish (Hives)    Intolerances        MEDICATIONS  (STANDING):  aspirin  chewable 81 milliGRAM(s) Oral daily  atorvastatin 40 milliGRAM(s) Oral at bedtime  enoxaparin Injectable 40 milliGRAM(s) SubCutaneous daily  insulin lispro (HumaLOG) corrective regimen sliding scale   SubCutaneous three times a day before meals  insulin lispro (HumaLOG) corrective regimen sliding scale   SubCutaneous at bedtime  losartan 100 milliGRAM(s) Oral daily  metoprolol tartrate 25 milliGRAM(s) Oral two times a day  triamterene 37.5 mG/hydrochlorothiazide 25 mG Capsule 2 Capsule(s) Oral daily    MEDICATIONS  (PRN):      Review of systems:    Constitutional: No fever, weight loss or fatigue    Eyes: No eye pain or discharge  ENMT:  No difficulty hearing; No sinus or throat pain  Neck: No pain or stiffness  Respiratory: No cough, wheezing, chills or hemoptysis  Cardiovascular: No chest pain, palpitations, shortness of breath, dyspnea on exertion  Gastrointestinal: No abdominal pain, nausea, vomiting or hematemesis; No diarrhea or constipation.   Genitourinary: No dysuria, frequency, hematuria or incontinence  Neurological: As per HPI  Skin: No rashes or lesions   Endocrine: No heat or cold intolerance; No hair loss  Musculoskeletal: No joint pain or swelling  Psychiatric: No depression, anxiety, mood swings  Heme/Lymph: No easy bruising or bleeding gums    Vital Signs Last 24 Hrs  T(C): 37 (15 Mar 2019 14:29), Max: 37 (15 Mar 2019 14:29)  T(F): 98.6 (15 Mar 2019 14:29), Max: 98.6 (15 Mar 2019 14:29)  HR: 84 (15 Mar 2019 17:53) (66 - 84)  BP: 152/63 (15 Mar 2019 17:53) (135/69 - 175/68)  BP(mean): --  RR: 17 (15 Mar 2019 17:53) (16 - 20)  SpO2: 98% (15 Mar 2019 17:53) (96% - 99%)    Neurologic Examination:  General:  Appearance is consistent with chronologic age.  No abnormal facies.   General: The patient is oriented to person, place, time and date.  Recent and remote memory intact.  Fund of knowledge is intact and normal.  Language with normal repetition, comprehension and naming.  Nondysarthric.    Cranial nerves: intact VA, VFF.  EOMI w/o nystagmus, skew or reported double vision.  PERRL.  No ptosis/weakness of eyelid closure.  Facial sensation is normal with normal bite.  No facial asymmetry.  Hearing grossly intact b/l.  Palate elevates midline.  Tongue midline.  Motor examination:   Normal tone, bulk and range of motion.  No tenderness, twitching, tremors or involuntary movements.  Formal Muscle Strength Testing: (MRC grade R/L) 5/5 UE; 5/5 LE.  No observable drift.  Reflexes:   2+ b/l pectoralis, biceps, triceps, brachioradialis, patella and Achilles.  Plantar response downgoing b/l.  Jaw jerk, Popeye, clonus absent.  Sensory examination:   Intact to light touch and pinprick, pain, temperature and proprioception and vibration in all extremities.  Cerebellum:   FTN/HKS intact with normal DIONNE in all limbs.  No dysmetria or dysdiadokinesia.  Gait narrow based and normal.    Labs:   CBC Full  -  ( 15 Mar 2019 06:36 )  WBC Count : 7.73 K/uL  Hemoglobin : 13.5 g/dL  Hematocrit : 42.9 %  Platelet Count - Automated : 237 K/uL  Mean Cell Volume : 83.6 fl  Mean Cell Hemoglobin : 26.3 pg  Mean Cell Hemoglobin Concentration : 31.5 gm/dL  Auto Neutrophil # : 3.58 K/uL  Auto Lymphocyte # : 3.13 K/uL  Auto Monocyte # : 0.74 K/uL  Auto Eosinophil # : 0.22 K/uL  Auto Basophil # : 0.04 K/uL  Auto Neutrophil % : 46.3 %  Auto Lymphocyte % : 40.5 %  Auto Monocyte % : 9.6 %  Auto Eosinophil % : 2.8 %  Auto Basophil % : 0.5 %    03-15    139  |  101  |  15  ----------------------------<  104<H>  3.7   |  32<H>  |  0.91    Ca    8.7      15 Mar 2019 06:36  Phos  4.1     03-15  Mg     2.5     03-15    TPro  8.3  /  Alb  3.6  /  TBili  0.5  /  DBili  x   /  AST  21  /  ALT  39  /  AlkPhos  97  03-14    LIVER FUNCTIONS - ( 14 Mar 2019 18:09 )  Alb: 3.6 g/dL / Pro: 8.3 g/dL / ALK PHOS: 97 U/L / ALT: 39 U/L DA / AST: 21 U/L / GGT: x             Urinalysis Basic - ( 14 Mar 2019 18:09 )    Color: Yellow / Appearance: Clear / S.010 / pH: x  Gluc: x / Ketone: Negative  / Bili: Negative / Urobili: Negative   Blood: x / Protein: Negative / Nitrite: Negative   Leuk Esterase: Negative / RBC: 0-2 /HPF / WBC 0-2 /HPF   Sq Epi: x / Non Sq Epi: Few /HPF / Bacteria: Trace /HPF          Neuroimaging:  NCHCT: CT Head No Cont:   EXAM:  CT BRAIN                            PROCEDURE DATE:  2019          INTERPRETATION:      CLINICAL INFORMATION:   63-year-old woman history of hypertension   HYPERTENSIVE URGENCY/     TECHNIQUE: Contiguous non contrast axial images of brain from skull base   to vertex sagittal and coronal reformations.   This scan was performed   using automatic exposure control (radiation dose reduction software) to   obtain a diagnostic image quality scan with patient dose as low as   reasonably achievable.      COMPARISON: No previous examinations are available for review.     FINDINGS:       There is crowding at the level of foramen magnum likely related to   underlying tonsillar ectopia, or brainstem lesion follow-up MRI suggested   for further assessment. There is normal size and configuration of the   ventricles and cortical sulci. Midline structures are intact.     There   is no CT evidence of acute territorial infarction.  There is no   hemorrhage, contusion or extraaxial collection.There is no acute   hydrocephalus. There are scattered intracranial arterial calcification.   There may be mild proptosis. The visualized paranasal sinuses are clear.    IMPRESSION:       Crowding of the posterior fossa and particularly at the level of foramen   magnum, posterior fossa lesion or tonsillar ectopia cannot be excluded,   further correlation with MRI is suggested. No acute hemorrhage or CT   evidence of an acute territorial infarction.                MATT MORE M.D., ATTENDING RADIOLOGIST  This document has been electronically signed. Mar 14 2019 10:15PM             (19 @ 21:58)    CT Angiography/Perfusion:  MRI Brain NC:  MRA Head/Neck:  EEG:    Assessment:  This is a 63y Female with h/o     Plan:   -   03-15-19 @ 21:26          Please contact the Neurology consult service with any questions.    Rafat Cat MD  Neurology Attending  [facility] Neurology Consult    Patient is a 63y old  Female who presents with a chief complaint of sob, chest pain and right sided abdominal pain (15 Mar 2019 14:05)    HPI:  Patient is a 63 year old female from home, with PMHx of  morbidly obese, Pre- diabetic, HTN, CHICA (on CPAP but noncompliant) presented to ED from PCP office with High BP. As per patient for last 3-4 days her SBP has been in 190's she went to see her PCP, advised her to go to hospital. Patient complains of chest discomfort, SOB, exertional dyspnea, b/l lower ext swelling and orthopnea. Patient also reports of right side abdominal pain radiating to right groin region associated with dysuria. Patient denies any h/o CVD or renal stones. Patient denies fever, chills, headaches, cough, nausea, vomiting, diarrhea, constipation, skin rashes, muscle or joint pains. Patient has been on multiple hypertensive medications, complains of allergies to BB, diuretics, CCB, lisinopril, currently pt was on losartan, nifedipine and HCTZ/ Triamterene. pt reports she has been noncompliant with medication due to side effects. Patient has CHICA on CPAP but she was not using it at home. (14 Mar 2019 20:47)    The patient states she saw a neurologist in [place] 7-8 years ago, who let her know about having "sinking" in the back of her head. She was not recommended any medication or surgery. She has also experienced intermittent numbness in both hands for many years, and has been told she has carpal tunnel syndrome in both hands.      PAST MEDICAL & SURGICAL HISTORY:  Obesity, morbid  Seasonal allergies  Bilateral carpal tunnel syndrome  DM (diabetes mellitus)  Uterine polyp  Postmenopausal bleeding  Asthma  HTN (hypertension)  CHICA (obstructive sleep apnea), noncompliant with CPAP  S/p Tubal ligation  Cellulitis: h/o both legs  S/P colonoscopy: 2013  S/P D&C: 2012  H/O  section:  &amp;   History of tonsillectomy    FAMILY HISTORY:  FH: type 2 diabetes  FH: HTN (hypertension)  Asthma    Social History: Occasional alcohol use; No tobacco or illicit drug use    Allergies  amlodipine (Short breath)  chlorthalidone (Anaphylaxis)  clonidine (Anaphylaxis)  diltiazem (Anaphylaxis)  furosemide (Short breath)  Lasix (Pruritus)  lisinopril (Anaphylaxis)  nebivolol (Anaphylaxis)  shellfish (Hives)    MEDICATIONS  (STANDING):  aspirin  chewable 81 milliGRAM(s) Oral daily  atorvastatin 40 milliGRAM(s) Oral at bedtime  enoxaparin Injectable 40 milliGRAM(s) SubCutaneous daily  insulin lispro (HumaLOG) corrective regimen sliding scale   SubCutaneous three times a day before meals  insulin lispro (HumaLOG) corrective regimen sliding scale   SubCutaneous at bedtime  losartan 100 milliGRAM(s) Oral daily  metoprolol tartrate 25 milliGRAM(s) Oral two times a day  triamterene 37.5 mG/hydrochlorothiazide 25 mG Capsule 2 Capsule(s) Oral daily    Review of systems:    Constitutional: No fever, weight loss or fatigue    Eyes: No eye pain or discharge  ENMT:  No difficulty hearing; No sinus or throat pain  Neck: No pain or stiffness  Respiratory: Shortness of breath present  Cardiovascular: Chest discomfort present  Gastrointestinal: Abdominal pain present   Genitourinary: No dysuria, frequency, hematuria or incontinence  Neurological: As per HPI  Skin: No rashes or lesions   Endocrine: No heat or cold intolerance  Musculoskeletal: Some knee pain present  Psychiatric: No depression, anxiety  Heme/Lymph: No easy bruising or bleeding      Vital Signs Last 24 Hrs  T(C): 37 (15 Mar 2019 14:29), Max: 37 (15 Mar 2019 14:29)  T(F): 98.6 (15 Mar 2019 14:29), Max: 98.6 (15 Mar 2019 14:29)  HR: 84 (15 Mar 2019 17:53) (66 - 84)  BP: 152/63 (15 Mar 2019 17:53) (135/69 - 175/68)  RR: 17 (15 Mar 2019 17:53) (16 - 20)  SpO2: 98% (15 Mar 2019 17:53) (96% - 99%)    General Exam:  General: Morbidly obese, No acute distress  Respiratory: Diminished breath sounds b/l.  Cardiovascular: RRR, No murmurs, Full b/l radial and pedal pulses, 2+ pitting edema in b/l LE    Neurological Exam:  General / Mental Status: Oriented to person, place, and time.  No dysarthria or aphasia present.  Naming and repetition intact.  Cranial Nerves: PERRLA, EOMI x 2, VFF x 4, No nystagmus or diplopia.  B/l V1-V3 equal to light touch and pinprick.  Symmetric facial movement and palate elevation.  B/l hearing equal to finger rub.  5/5 strength with b/l sternocleidomastoid and trapezius.  Midline tongue protrusion with no atrophy or fasciculations.  Motor: Normal bulk and tone in all four extremities.  5/5 strength throughout all four extremities.  No downward drift, rigidity, spasticity, or tremors in any of the four extremities.  Sensation: Intact to light touch and pinprick in all four extremities, including at b/l feet and b/l hands.  Coordination: No dysmetria with b/l finger-to-nose and heel raise tests.  Reflexes: 1+ and symmetric at b/l biceps, triceps, brachioradialis.  Absent at b/l patellae and ankles.  Toes mute b/l.  Gait and Romberg deferred per patient request.    NIHSS 0, MRS 0      Labs:   CBC Full  -  ( 15 Mar 2019 06:36 )  WBC Count : 7.73 K/uL  Hemoglobin : 13.5 g/dL  Hematocrit : 42.9 %  Platelet Count - Automated : 237 K/uL  Mean Cell Volume : 83.6 fl  Mean Cell Hemoglobin : 26.3 pg  Mean Cell Hemoglobin Concentration : 31.5 gm/dL  Auto Neutrophil # : 3.58 K/uL  Auto Lymphocyte # : 3.13 K/uL  Auto Monocyte # : 0.74 K/uL  Auto Eosinophil # : 0.22 K/uL  Auto Basophil # : 0.04 K/uL  Auto Neutrophil % : 46.3 %  Auto Lymphocyte % : 40.5 %  Auto Monocyte % : 9.6 %  Auto Eosinophil % : 2.8 %  Auto Basophil % : 0.5 %    -15    139  |  101  |  15  ----------------------------<  104<H>  3.7   |  32<H>  |  0.91    Ca    8.7      15 Mar 2019 06:36  Phos  4.1     03-15  Mg     2.5     -15    TPro  8.3  /  Alb  3.6  /  TBili  0.5  /  DBili  x   /  AST  21  /  ALT  39  /  AlkPhos  97  03-14    Urinalysis Basic - ( 14 Mar 2019 18:09 )  Color: Yellow / Appearance: Clear / S.010 / pH: x  Gluc: x / Ketone: Negative  / Bili: Negative / Urobili: Negative   Blood: x / Protein: Negative / Nitrite: Negative   Leuk Esterase: Negative / RBC: 0-2 /HPF / WBC 0-2 /HPF   Sq Epi: x / Non Sq Epi: Few /HPF / Bacteria: Trace /HPF      Neuroimaging:    CT BRAIN (2019): 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      Recommendations:     1. The findings on the CT Head are likely chronic, and do not require further inpatient neurological workup.    2. The patient can follow up in the Neurology clinic ([place] or [place]) for long-term management of Chiari I malformation and b/l hand numbness, which may be related or may be due to superimposed b/l carpal tunnel syndrome.    3. Outpatient EMG/NCS to help distinguish between a cervical spinal or peripheral nerve entrapment etiology.      Please contact the Neurology consult service with any questions.    Rafat Cat MD  Neurology Attending  [facility]

## 2019-03-15 NOTE — PROGRESS NOTE ADULT - ASSESSMENT
Patient is a 63 year old female from home, with PMHx of  morbidly obese, Pre- diabetic, HTN, CHICA ( on Cpap but non compliant) presented to ED from PCP office with High BP. As per patient for last 3-4 days her SBP has been in 190's she went to see her PCP, advised her to go to hospital. Patient complains of chest discomfort, SOB, exertional dyspnea, b/l lower ext swelling and orthopnea. Patient also reports of right side abdominal pain radiating to right groin region associated with dysuria. Patient denies any h/o CVD or renal stones. Patient denies fever, chills, headaches, cough, nausea, vomiting, diarrhea, constipation, skin rashes, muscle or joint pains. Patient has been on multiple hypertensive medications, complains of allergies to BB, diuretics, CCB, lisinopril, currently pt was on losartan, nifedipine and HCTZ/ Triamterene. pt reports she has been non- compliant with medication due to side effects. Patient has CHICA on Cpap but she was not using at home.         Chest pain [R07.9]  Patient presented with chest pain, sob, exertional dyspnea. On admission pt was afebrile, /68, Hr 84, RR 18, no leucocytosis, normal electrolytes and renal functions. Admitted to ACS r/o. CXR no congestion or consolidation. EKG NSR with no st t wave changes. SUPRIYA score 2 pt has 8% mortality risk.   - Cardiology's consult Dr Miranda  - Trop 0.054->0.060->0.057.  - Continue with aspirin, statin and BB  - BNP, TSH, A1c, lipid panel WNL  - CT A/P - No bowel obstruction or grossly thickened bowel wall. Colonic   diverticulosis without evidence for diverticulitis.   - Pt c/o persistent and significant intermittent RLQ abdominal pain radiating down to R inguinal and inner thigh areas. Although CT A/P with oral and Iv contrast did not show renal stones, renal US ordered to r/o nephrolithiasis given typical presentation of renal colic.  - F/u TTE  - Stress test IF symptoms persist, however this might need to be done as an outpt given patient's weight    DM (diabetes mellitus) [E11.9]  As per patient she is pre- diabetic   last A1c 6.4%  - sliding scale  - diabetic diet   - hba1c 6.3    Hypertension [I10]  -Likely from CHICA and poor compliance to the medications. Currently BP better controlled, goal should be <130/801 on diabetes patients.   -Pt needs to lose her weight and do regular exercise. CHICA should be also better controlled.  -Continue with metoprolol losartan, triamterene -HCTZ  -CT head - Crowding of the posterior fossa and particularly at the level of foramen magnum, possible posterior fossa lesion or tonsillar ectopia. No acute hemorrhage or CT evidence of an acute territorial infarction.   -Further correlation with MRI is to be performed. MRI for has been sent.    CHICA on CPAP [G47.33]  bakari Cpap    Prophylactic measure [Z29.9]  Lovenox for Dvt prophylaxis     CHICA (obstructive sleep apnea) [327.23]  Advised to use machine but pt reports not being able to afford one    Patient is a 63 year old female from home, with PMHx of  morbidly obese, Pre- diabetic, HTN, CHICA ( on Cpap but non compliant) presented to ED from PCP office with High BP. As per patient for last 3-4 days her SBP has been in 190's she went to see her PCP, advised her to go to hospital. Patient complains of chest discomfort, SOB, exertional dyspnea, b/l lower ext swelling and orthopnea. Patient also reports of right side abdominal pain radiating to right groin region associated with dysuria. Patient denies any h/o CVD or renal stones. Patient denies fever, chills, headaches, cough, nausea, vomiting, diarrhea, constipation, skin rashes, muscle or joint pains. Patient has been on multiple hypertensive medications, complains of allergies to BB, diuretics, CCB, lisinopril, currently pt was on losartan, nifedipine and HCTZ/ Triamterene. pt reports she has been non- compliant with medication due to side effects. Patient has CHICA on Cpap but she was not using at home.         Chest pain [R07.9]  Patient presented with chest pain, sob, exertional dyspnea. On admission pt was afebrile, /68, Hr 84, RR 18, no leucocytosis, normal electrolytes and renal functions. Admitted to ACS r/o. CXR no congestion or consolidation. EKG NSR with no st t wave changes. SUPRIYA score 2 pt has 8% mortality risk.   - Cardiology consult Dr Miranda  - Trop 0.054->0.060->0.057.  - Continue with aspirin, statin and BB  - BNP, TSH, A1c, lipid panel WNL  - CT A/P - No bowel obstruction or grossly thickened bowel wall. Colonic   diverticulosis without evidence for diverticulitis.   - Pt c/o persistent and significant intermittent RLQ abdominal pain radiating down to R inguinal and inner thigh areas. Although CT A/P with oral and Iv contrast did not show renal stones, renal US ordered to r/o nephrolithiasis given typical presentation of renal colic.  - F/u TTE  - Stress test IF symptoms persist, however this might need to be done as an outpt given patient's weight    DM (diabetes mellitus) [E11.9]  As per patient she is pre- diabetic   last A1c 6.4%  - sliding scale  - diabetic diet   - hba1c 6.3    Hypertension [I10]  -Likely from CHICA and poor compliance to the medications. Currently BP better controlled, goal should be <130/801 on diabetes patients.   -Pt needs to lose her weight and do regular exercise. CHICA should be also better controlled.  -Continue with metoprolol losartan, triamterene-HCTZ  -CT head - Crowding of the posterior fossa and particularly at the level of foramen magnum, possible posterior fossa lesion or tonsillar ectopia. No acute hemorrhage or CT evidence of an acute territorial infarction.   -Further correlation with MRI is to be performed. MRI for has been sent.    CHICA on CPAP [G47.33]  bakari Cpap    Prophylactic measure [Z29.9]  Lovenox for Dvt prohylaxis     CHICA (obstructive sleep apnea) [327.23]  Advised to use machine but pt reports not being able to afford one

## 2019-03-16 LAB
ANION GAP SERPL CALC-SCNC: 4 MMOL/L — LOW (ref 5–17)
BUN SERPL-MCNC: 21 MG/DL — HIGH (ref 7–18)
CALCIUM SERPL-MCNC: 8.7 MG/DL — SIGNIFICANT CHANGE UP (ref 8.4–10.5)
CHLORIDE SERPL-SCNC: 104 MMOL/L — SIGNIFICANT CHANGE UP (ref 96–108)
CO2 SERPL-SCNC: 31 MMOL/L — SIGNIFICANT CHANGE UP (ref 22–31)
CREAT SERPL-MCNC: 1.05 MG/DL — SIGNIFICANT CHANGE UP (ref 0.5–1.3)
CULTURE RESULTS: SIGNIFICANT CHANGE UP
GLUCOSE SERPL-MCNC: 121 MG/DL — HIGH (ref 70–99)
HCT VFR BLD CALC: 44.5 % — SIGNIFICANT CHANGE UP (ref 34.5–45)
HGB BLD-MCNC: 13.8 G/DL — SIGNIFICANT CHANGE UP (ref 11.5–15.5)
MCHC RBC-ENTMCNC: 26.1 PG — LOW (ref 27–34)
MCHC RBC-ENTMCNC: 31 GM/DL — LOW (ref 32–36)
MCV RBC AUTO: 84.3 FL — SIGNIFICANT CHANGE UP (ref 80–100)
NRBC # BLD: 0 /100 WBCS — SIGNIFICANT CHANGE UP (ref 0–0)
PLATELET # BLD AUTO: 236 K/UL — SIGNIFICANT CHANGE UP (ref 150–400)
POTASSIUM SERPL-MCNC: 4.3 MMOL/L — SIGNIFICANT CHANGE UP (ref 3.5–5.3)
POTASSIUM SERPL-SCNC: 4.3 MMOL/L — SIGNIFICANT CHANGE UP (ref 3.5–5.3)
RBC # BLD: 5.28 M/UL — HIGH (ref 3.8–5.2)
RBC # FLD: 15.9 % — HIGH (ref 10.3–14.5)
SODIUM SERPL-SCNC: 139 MMOL/L — SIGNIFICANT CHANGE UP (ref 135–145)
SPECIMEN SOURCE: SIGNIFICANT CHANGE UP
VIT B12 SERPL-MCNC: 350 PG/ML — SIGNIFICANT CHANGE UP (ref 232–1245)
WBC # BLD: 6.57 K/UL — SIGNIFICANT CHANGE UP (ref 3.8–10.5)
WBC # FLD AUTO: 6.57 K/UL — SIGNIFICANT CHANGE UP (ref 3.8–10.5)

## 2019-03-16 PROCEDURE — 99223 1ST HOSP IP/OBS HIGH 75: CPT

## 2019-03-16 PROCEDURE — 99232 SBSQ HOSP IP/OBS MODERATE 35: CPT

## 2019-03-16 RX ORDER — HYDRALAZINE HCL 50 MG
10 TABLET ORAL ONCE
Qty: 0 | Refills: 0 | Status: COMPLETED | OUTPATIENT
Start: 2019-03-16 | End: 2019-03-16

## 2019-03-16 RX ADMIN — Medication 25 MILLIGRAM(S): at 05:13

## 2019-03-16 RX ADMIN — LOSARTAN POTASSIUM 100 MILLIGRAM(S): 100 TABLET, FILM COATED ORAL at 05:13

## 2019-03-16 RX ADMIN — Medication 2 CAPSULE(S): at 05:13

## 2019-03-16 RX ADMIN — ENOXAPARIN SODIUM 40 MILLIGRAM(S): 100 INJECTION SUBCUTANEOUS at 12:23

## 2019-03-16 RX ADMIN — ATORVASTATIN CALCIUM 40 MILLIGRAM(S): 80 TABLET, FILM COATED ORAL at 22:12

## 2019-03-16 RX ADMIN — Medication 81 MILLIGRAM(S): at 12:23

## 2019-03-16 RX ADMIN — Medication 25 MILLIGRAM(S): at 17:30

## 2019-03-16 NOTE — PROGRESS NOTE ADULT - ASSESSMENT
Patient is a 63 year old female from home, with PMHx of  morbidly obese, Pre- diabetic, HTN, CHICA ( on Cpap but non compliant) presented to ED from PCP office with High BP. Patient also reports of right side abdominal pain radiating to right groin region associated with dysuria. Patient has been on multiple hypertensive medications, complains of allergies to BB, diuretics, CCB, lisinopril, currently pt was on losartan, nifedipine and HCTZ/ Triamterene. pt reports she has been non- compliant with medication due to side effects. Patient has CHICA on Cpap but she was not using at home.       -Chest pain [R07.9]  Patient presented with chest pain, sob, exertional dyspnea.  - Cardiology's consult Dr Miranda  - Trop 0.054->0.060->0.057.  - Continue with aspirin, statin and BB  - Stress test IF symptoms persist, however this might need to be done as an outpt given patient's weight    -Hypertensive urgency:  improved. cw losartan   -poor compliance to the medications. Currently BP better controlled, goal should be <130/801 on diabetes patients.   -Pt needs to lose her weight and do regular exercise. CHICA should be also better controlled.  -Continue with metoprolol losartan, triamterene -HCTZ    -CT head - Crowding of the posterior fossa and particularly at the level of foramen magnum, possible posterior fossa lesion or tonsillar ectopia.   No acute hemorrhage or CT evidence of an acute territorial infarction.   Neurology consulted. Dr Garcia.     - Pt c/o persistent and significant intermittent RLQ abdominal pain radiating down to R inguinal and inner thigh areas.   Although CT A/P with oral and Iv contrast did not show renal stones, renal US ordered to r/o nephrolithiasis given typical presentation of renal colic.    -DM (diabetes mellitus) [E11.9]  As per patient she is pre- diabetic   last A1c 6.4%  - sliding scale  - diabetic diet   - hba1c 6.3    -CHICA on CPAP [G47.33]  bakari Cpap    Prophylactic measure [Z29.9]  Lovenox for Dvt prophylaxis

## 2019-03-16 NOTE — CHART NOTE - NSCHARTNOTEFT_GEN_A_CORE
I got paged regarding the patient as Blood pressure was high 1720/67  I asked the nurse to give Hydralazine 10mg po now and measure BP again in 60 minutes.

## 2019-03-16 NOTE — CONSULT NOTE ADULT - REASON FOR ADMISSION
sob, chest pain and right sided abdominal pain

## 2019-03-16 NOTE — CONSULT NOTE ADULT - SUBJECTIVE AND OBJECTIVE BOX
Patient is a 63y old  Female who presents with a chief complaint of sob, chest pain and right sided abdominal pain (16 Mar 2019 16:04)      HPI:  Patient is a 63 year old female from home, with PMHx of  morbidly obese, Pre- diabetic, HTN, CHICA ( on Cpap but non compliant) presented to ED from PCP office with High BP. As per patient for last 3-4 days her SBP has been in 190's she went to see her PCP, advised her to go to hospital. Patient complains of chest discomfort, SOB, exertional dyspnea, b/l lower ext swelling and orthopnea. Patient also reports of right side abdominal pain radiating to right groin region associated with dysuria. Patient denies any h/o CVD or renal stones. Patient denies fever, chills, headaches, cough, nausea, vomiting, diarrhea, constipation, skin rashes, muscle or joint pains. Patient has been on multiple hypertensive medications, complains of allergies to BB, diuretics, CCB, lisinopril, currently pt was on losartan, nifedipine and HCTZ/ Triamterene. pt reports she has been non- compliant with medication due to side effects. Patient has CHICA on Cpap but she was not using at home. (14 Mar 2019 20:47)         Neurological Review of Systems:  No difficulty with language.  No vision loss or double vision.  No dizziness, vertigo or new hearing loss.  No difficulty with speech or swallowing.  No focal weakness.  No focal sensory changes.  No numbness or tingling in the bilateral lower extremities.  No difficulty with balance.  No difficulty with ambulation.        MEDICATIONS  (STANDING):  aspirin  chewable 81 milliGRAM(s) Oral daily  atorvastatin 40 milliGRAM(s) Oral at bedtime  enoxaparin Injectable 40 milliGRAM(s) SubCutaneous daily  insulin lispro (HumaLOG) corrective regimen sliding scale   SubCutaneous three times a day before meals  insulin lispro (HumaLOG) corrective regimen sliding scale   SubCutaneous at bedtime  losartan 100 milliGRAM(s) Oral daily  metoprolol tartrate 25 milliGRAM(s) Oral two times a day  triamterene 37.5 mG/hydrochlorothiazide 25 mG Capsule 2 Capsule(s) Oral daily    MEDICATIONS  (PRN):    Allergies    amlodipine (Short breath)  chlorthalidone (Anaphylaxis)  clonidine (Anaphylaxis)  diltiazem (Anaphylaxis)  furosemide (Short breath)  Lasix (Pruritus)  lisinopril (Anaphylaxis)  nebivolol (Anaphylaxis)  shellfish (Hives)    Intolerances      PAST MEDICAL & SURGICAL HISTORY:  Obesity, morbid  Seasonal allergies  Bilateral carpal tunnel syndrome  DM (diabetes mellitus)  Uterine polyp  Postmenopausal bleeding  Asthma  HTN (hypertension)  CHICA (obstructive sleep apnea): &quot;Advised to use machine &amp; couldn&#x27;t afford get one&quot;  Tubal ligation status  Cellulitis: h/o   both legs  S/P colonoscopy: 2013  S/P D&C: 2012  H/O  section:  &amp;   History of tonsillectomy    FAMILY HISTORY:  FH: type 2 diabetes  FH: HTN (hypertension)  Asthma    SOCIAL HISTORY: non smoker/ former smoker/ active smoker    Review of Systems:  Constitutional: No generalized weakness. No fevers or chills.                    Eyes, Ears, Mouth, Throat: No vision loss   Respiratory: No shortness of breath or cough.                                Cardiovascular: No chest pain or palpitations  Gastrointestinal: No nausea or vomiting.                                         Genitourinary: No urinary incontinence or burning on urination.  Musculoskeletal: No joint pain.                                                           Dermatologic: No rash.  Neurological: as per HPI                                                                      Psychiatric: No behavioral problems.  Endocrine: No known hypoglycemia.               Hematologic/Lymphatic: No easy bleeding.    O:  Vital Signs Last 24 Hrs  T(C): 36.8 (16 Mar 2019 14:57), Max: 36.8 (16 Mar 2019 14:57)  T(F): 98.3 (16 Mar 2019 14:57), Max: 98.3 (16 Mar 2019 14:57)  HR: 78 (16 Mar 2019 14:57) (69 - 84)  BP: 158/83 (16 Mar 2019 14:57) (126/55 - 172/67)  BP(mean): 79 (16 Mar 2019 05:11) (79 - 79)  RR: 18 (16 Mar 2019 14:57) (15 - 18)  SpO2: 97% (16 Mar 2019 14:57) (96% - 100%)    General Exam:   General appearance: No acute distress                 Cardiovascular: Pedal dorsalis pulses intact bilaterally    Mental Status: Orientated to self, date and place.  Attention intact.  No dysarthria, aphasia or neglect.  Knowledge intact.  Registration intact.  Short and long term memory grossly intact.      Cranial Nerves: CN I - not tested.  PERRL, EOMI, VFF, no nystagmus or diplopia.  No APD.  Fundi not visualized.  CN V1-3 intact to light touch and pinprick.  No facial asymmetry.  Hearing intact to finger rub bilaterally.  Tongue, uvula and palate midline.  Sternocleidomastoid and Trapezius intact bilaterally.    Motor:   Tone: normal.                  Strength intact throughout  No pronator drift bilaterally                      No dysmetria on finger-nose-finger or heel-shin-heel  No truncal ataxia.  No resting, postural or action tremor.  No myoclonus.    Sensation: intact to light touch, pinprick, vibration and proprioception    Deep Tendon Reflexes: 1+ bilateral biceps, triceps, brachioradialis, knee and ankle  Toes flexor bilaterally    Gait: normal and stable.  Rhomberg -harmony.    Other:     LABS:                        13.8   6.57  )-----------( 236      ( 16 Mar 2019 06:10 )             44.5     03-16    139  |  104  |  21<H>  ----------------------------<  121<H>  4.3   |  31  |  1.05    Ca    8.7      16 Mar 2019 06:10  Phos  4.1     03-15  Mg     2.5     03-15    TPro  8.3  /  Alb  3.6  /  TBili  0.5  /  DBili  x   /  AST  21  /  ALT  39  /  AlkPhos  97  03-14      Urinalysis Basic - ( 14 Mar 2019 18:09 )    Color: Yellow / Appearance: Clear / S.010 / pH: x  Gluc: x / Ketone: Negative  / Bili: Negative / Urobili: Negative   Blood: x / Protein: Negative / Nitrite: Negative   Leuk Esterase: Negative / RBC: 0-2 /HPF / WBC 0-2 /HPF   Sq Epi: x / Non Sq Epi: Few /HPF / Bacteria: Trace /HPF          RADIOLOGY & ADDITIONAL STUDIES:    EKG: < from: 12 Lead ECG (19 @ 19:09) >  Diagnosis Line Normal sinus rhythm  Normal ECG    < end of copied text >    < from: CT Head No Cont (19 @ 21:58) > (images reviwed)  IMPRESSION:       Crowding of the posterior fossa and particularly at the level of foramen   magnum, posterior fossa lesion or tonsillar ectopia cannot be excluded,   further correlation with MRI is suggested. No acute hemorrhage or CT   evidence of an acute territorial infarction.    < end of copied text > Patient is a 63y old  Female who presents with a chief complaint of sob, chest pain and right sided abdominal pain (16 Mar 2019 16:04)      HPI:  Patient is a 63 year old female from home, with PMHx of  morbidly obese, Pre- diabetic, HTN, CHICA ( on Cpap but non compliant) presented to ED from PCP office with High BP. As per patient for last 3-4 days her SBP has been in 190's she went to see her PCP, advised her to go to hospital, neurology called for abnormal CTH finding.  Patient has occational headaches headaches and has been having a headache with this BP.  She has been also dizzy.      Patient has been on multiple hypertensive medications, complains of allergies to BB, diuretics, CCB, lisinopril, currently pt was on losartan, nifedipine and HCTZ/ Triamterene. pt reports she has been non- compliant with medication due to side effects. Patient has CHICA on Cpap but she was not using at home. (14 Mar 2019 20:47)    Neurological Review of Systems:  No difficulty with language.  No vision loss or double vision.  No vertigo or new hearing loss.  No difficulty with speech or swallowing.  No focal weakness.  No focal sensory changes.  + numbness or tingling in the bilateral lower extremities and difficulty with balance, chronic.  No difficulty with ambulation.        MEDICATIONS  (STANDING):  aspirin  chewable 81 milliGRAM(s) Oral daily  atorvastatin 40 milliGRAM(s) Oral at bedtime  enoxaparin Injectable 40 milliGRAM(s) SubCutaneous daily  insulin lispro (HumaLOG) corrective regimen sliding scale   SubCutaneous three times a day before meals  insulin lispro (HumaLOG) corrective regimen sliding scale   SubCutaneous at bedtime  losartan 100 milliGRAM(s) Oral daily  metoprolol tartrate 25 milliGRAM(s) Oral two times a day  triamterene 37.5 mG/hydrochlorothiazide 25 mG Capsule 2 Capsule(s) Oral daily    MEDICATIONS  (PRN):    Allergies    amlodipine (Short breath)  chlorthalidone (Anaphylaxis)  clonidine (Anaphylaxis)  diltiazem (Anaphylaxis)  furosemide (Short breath)  Lasix (Pruritus)  lisinopril (Anaphylaxis)  nebivolol (Anaphylaxis)  shellfish (Hives)    Intolerances      PAST MEDICAL & SURGICAL HISTORY:  Obesity, morbid  Seasonal allergies  Bilateral carpal tunnel syndrome  DM (diabetes mellitus)  Uterine polyp  Postmenopausal bleeding  Asthma  HTN (hypertension)  CHICA (obstructive sleep apnea): &quot;Advised to use machine &amp; couldn&#x27;t afford get one&quot;  Tubal ligation status  Cellulitis: h/o   both legs  S/P colonoscopy: 2013  S/P D&C: 2012  H/O  section:  &amp;   History of tonsillectomy    FAMILY HISTORY:  FH: type 2 diabetes  FH: HTN (hypertension)  Asthma    SOCIAL HISTORY: non smoker    Review of Systems:  Constitutional: No fevers.                    Eyes, Ears, Mouth, Throat: No vision loss   Respiratory: No cough.                                Cardiovascular: No chest pain.  Gastrointestinal: No nausea or vomiting.                                         Genitourinary: No burning on urination.  Musculoskeletal: No joint pain.                                                           Dermatologic: No rash.  Neurological: as per HPI                                                                      Psychiatric: No behavioral problems.  Endocrine: No known hypoglycemia.               Hematologic/Lymphatic: No easy bleeding.    O:  Vital Signs Last 24 Hrs  T(C): 36.8 (16 Mar 2019 14:57), Max: 36.8 (16 Mar 2019 14:57)  T(F): 98.3 (16 Mar 2019 14:57), Max: 98.3 (16 Mar 2019 14:57)  HR: 78 (16 Mar 2019 14:57) (69 - 84)  BP: 158/83 (16 Mar 2019 14:57) (126/55 - 172/67)  BP(mean): 79 (16 Mar 2019 05:11) (79 - 79)  RR: 18 (16 Mar 2019 14:57) (15 - 18)  SpO2: 97% (16 Mar 2019 14:57) (96% - 100%)    General Exam:   General appearance: No acute distress                 Cardiovascular: Pedal dorsalis pulses intact bilaterally    Mental Status: Orientated to self, date and place.  Attention intact.  No dysarthria, aphasia or neglect.  Knowledge intact.  Registration intact.  Short and long term memory grossly intact.      Cranial Nerves: CN I - not tested.  PERRL, EOMI, VFF, no nystagmus or diplopia.  No APD.  Fundi not visualized.  CN V1-3 intact to light touch.  No facial asymmetry.  Hearing intact to finger rub bilaterally.  Tongue, uvula and palate midline.  Sternocleidomastoid and Trapezius intact bilaterally.    Motor:   Tone: normal.                  Strength intact throughout  No pronator drift bilaterally                      No dysmetria on finger-nose-finger or heel-shin-heel    Sensation: stocking glove sensory loss to light touch    Deep Tendon Reflexes: 1+ bilateral biceps, triceps, brachioradialis, knee and 0 bl ankles  Toes flexor bilaterally    Gait: normal and stable.    Other:     LABS:                        13.8   6.57  )-----------( 236      ( 16 Mar 2019 06:10 )             44.5     03-16    139  |  104  |  21<H>  ----------------------------<  121<H>  4.3   |  31  |  1.05    Ca    8.7      16 Mar 2019 06:10  Phos  4.1     03-15  Mg     2.5     03-15    TPro  8.3  /  Alb  3.6  /  TBili  0.5  /  DBili  x   /  AST  21  /  ALT  39  /  AlkPhos  97  03-14      Urinalysis Basic - ( 14 Mar 2019 18:09 )    Color: Yellow / Appearance: Clear / S.010 / pH: x  Gluc: x / Ketone: Negative  / Bili: Negative / Urobili: Negative   Blood: x / Protein: Negative / Nitrite: Negative   Leuk Esterase: Negative / RBC: 0-2 /HPF / WBC 0-2 /HPF   Sq Epi: x / Non Sq Epi: Few /HPF / Bacteria: Trace /HPF          RADIOLOGY & ADDITIONAL STUDIES:    EKG: < from: 12 Lead ECG (19 @ 19:09) >  Diagnosis Line Normal sinus rhythm  Normal ECG    < end of copied text >    < from: CT Head No Cont (19 @ 21:58) > (images reviwed)  IMPRESSION:       Crowding of the posterior fossa and particularly at the level of foramen   magnum, posterior fossa lesion or tonsillar ectopia cannot be excluded,   further correlation with MRI is suggested. No acute hemorrhage or CT   evidence of an acute territorial infarction.    < end of copied text >

## 2019-03-16 NOTE — CONSULT NOTE ADULT - ASSESSMENT
Patient is a 63 year old female from home, with PMHx of  morbidly obese, Pre- diabetic, HTN, CHICA ( on Cpap but non compliant) presented to ED from PCP office with High BP. As per patient for last 3-4 days her SBP has been in 190's she went to see her PCP, advised her to go to hospital. Patient complains of chest discomfort, SOB, exertional dyspnea, b/l lower ext swelling and orthopnea. Patient also reports of right side abdominal pain radiating to right groin region associated with dysuria. Patient denies any h/o CVD or renal stones. Patient denies fever, chills, headaches, cough, nausea, vomiting, diarrhea, constipation, skin rashes, muscle or joint pains. Patient has been on multiple hypertensive medications, complains of allergies to BB, diuretics, CCB, lisinopril, currently pt was on losartan, nifedipine and HCTZ/ Triamterene. Pt reports she has been non- compliant with medication due to side effects. Patient has CHICA on Cpap but she was not using at home.    1. Uncontrolled blood pressure  -Likely from CHICA and poor compliance to the medications. Currently BP better controlled, goal should be <130/801 on diabetes patients.   -Currently asymptomatic. Monitor BP and adjust meds appropriately.  -Pt needs to lose her weight and do regular exercise. CHICA should be also better controlled.    2. Chest pain/tightness  -Currently asymptomatic. EKG normal. Cardiac enzymes were mildly elevated but is downtrending.  -Awaiting TTE  -c/w aspirin, atorvastatin, and beta blocker.  -Will decide need for stress test if symptoms persist, however due to her weight unsure if it can be done with our machine.
Dizziness and headache in patient with HTN urgency/ emergency concerning for possible stroke with CTH shows possible non specific abnormalities  Sensory loss in  bl legs cw peripheral neuropathy    Recommend:  MRI brain to evaluate for stroke, if there is stroke can complete stroke w/p  outpatient ncs/emg for evaluation of neuropathy

## 2019-03-17 LAB
ANION GAP SERPL CALC-SCNC: 6 MMOL/L — SIGNIFICANT CHANGE UP (ref 5–17)
BUN SERPL-MCNC: 20 MG/DL — HIGH (ref 7–18)
CALCIUM SERPL-MCNC: 9 MG/DL — SIGNIFICANT CHANGE UP (ref 8.4–10.5)
CHLORIDE SERPL-SCNC: 103 MMOL/L — SIGNIFICANT CHANGE UP (ref 96–108)
CO2 SERPL-SCNC: 28 MMOL/L — SIGNIFICANT CHANGE UP (ref 22–31)
CREAT SERPL-MCNC: 1.27 MG/DL — SIGNIFICANT CHANGE UP (ref 0.5–1.3)
GLUCOSE SERPL-MCNC: 107 MG/DL — HIGH (ref 70–99)
HCT VFR BLD CALC: 46.7 % — HIGH (ref 34.5–45)
HGB BLD-MCNC: 14.4 G/DL — SIGNIFICANT CHANGE UP (ref 11.5–15.5)
MCHC RBC-ENTMCNC: 25.9 PG — LOW (ref 27–34)
MCHC RBC-ENTMCNC: 30.8 GM/DL — LOW (ref 32–36)
MCV RBC AUTO: 83.8 FL — SIGNIFICANT CHANGE UP (ref 80–100)
NRBC # BLD: 0 /100 WBCS — SIGNIFICANT CHANGE UP (ref 0–0)
PLATELET # BLD AUTO: 264 K/UL — SIGNIFICANT CHANGE UP (ref 150–400)
POTASSIUM SERPL-MCNC: 3.7 MMOL/L — SIGNIFICANT CHANGE UP (ref 3.5–5.3)
POTASSIUM SERPL-SCNC: 3.7 MMOL/L — SIGNIFICANT CHANGE UP (ref 3.5–5.3)
RBC # BLD: 5.57 M/UL — HIGH (ref 3.8–5.2)
RBC # FLD: 16 % — HIGH (ref 10.3–14.5)
SODIUM SERPL-SCNC: 137 MMOL/L — SIGNIFICANT CHANGE UP (ref 135–145)
WBC # BLD: 6.89 K/UL — SIGNIFICANT CHANGE UP (ref 3.8–10.5)
WBC # FLD AUTO: 6.89 K/UL — SIGNIFICANT CHANGE UP (ref 3.8–10.5)

## 2019-03-17 PROCEDURE — 99233 SBSQ HOSP IP/OBS HIGH 50: CPT | Mod: GC

## 2019-03-17 RX ADMIN — ENOXAPARIN SODIUM 40 MILLIGRAM(S): 100 INJECTION SUBCUTANEOUS at 12:19

## 2019-03-17 RX ADMIN — Medication 2 CAPSULE(S): at 05:42

## 2019-03-17 RX ADMIN — Medication 81 MILLIGRAM(S): at 12:19

## 2019-03-17 RX ADMIN — Medication 25 MILLIGRAM(S): at 18:11

## 2019-03-17 RX ADMIN — ATORVASTATIN CALCIUM 40 MILLIGRAM(S): 80 TABLET, FILM COATED ORAL at 21:55

## 2019-03-17 RX ADMIN — Medication 25 MILLIGRAM(S): at 05:43

## 2019-03-17 RX ADMIN — LOSARTAN POTASSIUM 100 MILLIGRAM(S): 100 TABLET, FILM COATED ORAL at 05:42

## 2019-03-17 NOTE — DIETITIAN INITIAL EVALUATION ADULT. - OTHER INFO
Pt visited. OOB to chair. Pt  reports good appetite. Food choices Obtained. Pt uses Bipap at night.  per pt Dm x  ~4 yrs Pt takes metformin at home. Pt visited. OOB to chair. Pt  reports good appetite. Food choices Obtained. Pt uses  C PAP  at night.  per pt Dm x  ~4 yrs Pt takes metformin at home.

## 2019-03-17 NOTE — PROGRESS NOTE ADULT - ASSESSMENT
· Assessment	  Patient is a 63 year old female from home, with PMHx of  morbidly obese, Pre- diabetic, HTN, CHICA ( on Cpap but non compliant) presented to ED from PCP office with High BP. As per patient for last 3-4 days her SBP has been in 190's she went to see her PCP, advised her to go to hospital. Patient complains of chest discomfort, SOB, exertional dyspnea, b/l lower ext swelling and orthopnea. Patient also reports of right side abdominal pain radiating to right groin region associated with dysuria. Patient denies any h/o CVD or renal stones. Patient denies fever, chills, headaches, cough, nausea, vomiting, diarrhea, constipation, skin rashes, muscle or joint pains. Patient has been on multiple hypertensive medications, complains of allergies to BB, diuretics, CCB, lisinopril, currently pt was on losartan, nifedipine and HCTZ/ Triamterene. pt reports she has been non- compliant with medication due to side effects. Patient has CHICA on Cpap but she was not using at home.         Chest pain [R07.9]  Patient presented with chest pain, sob, exertional dyspnea. On admission pt was afebrile, /68, Hr 84, RR 18, no leucocytosis, normal electrolytes and renal functions. Admitted to ACS r/o. CXR no congestion or consolidation. EKG NSR with no st t wave changes. SUPRIYA score 2 pt has 8% mortality risk. Trop 0.054->0.060->0.057. BNP, TSH, A1c, lipid panel WNL. CT A/P - No bowel obstruction or grossly thickened bowel wall. Colonic diverticulosis without evidence for diverticulitis.  - Cardiology's consult Dr Miranda  - Continue with aspirin, statin and BB  - Pt c/o persistent and significant intermittent RLQ abdominal pain radiating down to R inguinal and inner thigh areas. Although CT A/P with oral and Iv contrast did not show renal stones, renal US ordered to r/o nephrolithiasis given typical presentation of renal colic.  TTE WNL.  - Stress test if symptoms persist, however this might need to be done as an outpt given patient's weight  - B/l LE varicose veins with TTP noted  - F/u Doppler b/l LE    DM (diabetes mellitus) [E11.9]  As per patient she is pre- diabetic   last A1c 6.4%  - sliding scale  - diabetic diet   - hba1c 6.3    Hypertension [I10]  -Likely from CHICA and poor compliance to the medications. Currently BP better controlled, goal should be <130/801 on diabetes patients.   -Pt needs to lose her weight and do regular exercise. CHICA should be also better controlled.  -Continue with metoprolol losartan, triamterene -HCTZ  -CT head - Crowding of the posterior fossa and particularly at the level of foramen magnum, possible posterior fossa lesion or tonsillar ectopia. No acute hemorrhage or CT evidence of an acute territorial infarction.   -Dizziness and headache in patient with HTN urgency/emergency concerning for possible stroke -MRI brain to evaluate for stroke, if there is stroke can complete stroke w/p  -The patient can follow up in the Neurology clinic ([facility] or [place]) for long-term management of Chiari I malformation and b/l hand numbness, which may be related or may be due to superimposed b/l carpal tunnel syndrome  -Outpatient EMG/NCS to help distinguish between a cervical spinal or peripheral nerve entrapment etiology    CHICA on CPAP [G47.33]  bakari Cpap    Prophylactic measure [Z29.9]  Lovenox for Dvt prophylaxis     CHICA (obstructive sleep apnea) [327.23]  Advised to use machine but pt reports not being able to afford one · Assessment	  Patient is a 63 year old female from home, with PMHx of  morbidly obese, Pre- diabetic, HTN, CHICA ( on Cpap but non compliant) presented to ED from PCP office with High BP. As per patient for last 3-4 days her SBP has been in 190's she went to see her PCP, advised her to go to hospital. Patient complains of chest discomfort, SOB, exertional dyspnea, b/l lower ext swelling and orthopnea. Patient also reports of right side abdominal pain radiating to right groin region associated with dysuria. Patient denies any h/o CVD or renal stones. Patient denies fever, chills, headaches, cough, nausea, vomiting, diarrhea, constipation, skin rashes, muscle or joint pains. Patient has been on multiple hypertensive medications, complains of allergies to BB, diuretics, CCB, lisinopril, currently pt was on losartan, nifedipine and HCTZ/ Triamterene. pt reports she has been non- compliant with medication due to side effects. Patient has CHICA on Cpap but she was not using at home.       Chest pain [R07.9]  Patient presented with chest pain, sob, exertional dyspnea. On admission pt was afebrile, /68, Hr 84, RR 18, no leucocytosis, normal electrolytes and renal functions. Admitted to ACS r/o. CXR no congestion or consolidation. EKG NSR with no st t wave changes. SUPRIYA score 2 pt has 8% mortality risk. Trop 0.054->0.060->0.057. BNP, TSH, A1c, lipid panel WNL. CT A/P - No bowel obstruction or grossly thickened bowel wall. Colonic diverticulosis without evidence for diverticulitis.  - Cardiology's consult Dr Miranda  - Continue with aspirin, statin and BB  - Pt c/o persistent and significant intermittent RLQ abdominal pain radiating down to R inguinal and inner thigh areas. Although CT A/P with oral and Iv contrast did not show renal stones, renal US ordered to r/o nephrolithiasis given typical presentation of renal colic.  TTE WNL.  - Stress test if symptoms persist, however this might need to be done as an outpt given patient's weight  - B/l LE varicose veins with TTP noted  - F/u Doppler b/l LE    DM (diabetes mellitus) [E11.9]  As per patient she is pre- diabetic   last A1c 6.4%  - sliding scale  - diabetic diet   - hba1c 6.3    Hypertension [I10]  -Likely from CHICA and poor compliance to the medications. Currently BP better controlled, goal should be <130/801 on diabetes patients.   -Pt needs to lose her weight and do regular exercise. CHICA should be also better controlled.  -Continue with metoprolol losartan, triamterene -HCTZ  -CT head - Crowding of the posterior fossa and particularly at the level of foramen magnum, possible posterior fossa lesion or tonsillar ectopia. No acute hemorrhage or CT evidence of an acute territorial infarction.   -Dizziness and headache in patient with HTN urgency/emergency concerning for possible stroke -MRI brain to evaluate for stroke, if there is stroke can complete stroke w/p  -The patient can follow up in the Neurology clinic ([facility] or [place]) for long-term management of Chiari I malformation and b/l hand numbness, which may be related or may be due to superimposed b/l carpal tunnel syndrome  -Outpatient EMG/NCS to help distinguish between a cervical spinal or peripheral nerve entrapment etiology    CHICA on CPAP [G47.33]  bakari Cpap    Prophylactic measure [Z29.9]  Lovenox for Dvt prophylaxis     CHICA (obstructive sleep apnea) [327.23]  Advised to use machine but pt reports not being able to afford one

## 2019-03-18 LAB
ANION GAP SERPL CALC-SCNC: 4 MMOL/L — LOW (ref 5–17)
BUN SERPL-MCNC: 18 MG/DL — SIGNIFICANT CHANGE UP (ref 7–18)
CALCIUM SERPL-MCNC: 8.9 MG/DL — SIGNIFICANT CHANGE UP (ref 8.4–10.5)
CHLORIDE SERPL-SCNC: 101 MMOL/L — SIGNIFICANT CHANGE UP (ref 96–108)
CO2 SERPL-SCNC: 31 MMOL/L — SIGNIFICANT CHANGE UP (ref 22–31)
CREAT SERPL-MCNC: 0.89 MG/DL — SIGNIFICANT CHANGE UP (ref 0.5–1.3)
GLUCOSE SERPL-MCNC: 105 MG/DL — HIGH (ref 70–99)
HCT VFR BLD CALC: 42.8 % — SIGNIFICANT CHANGE UP (ref 34.5–45)
HGB BLD-MCNC: 13.5 G/DL — SIGNIFICANT CHANGE UP (ref 11.5–15.5)
MCHC RBC-ENTMCNC: 26.5 PG — LOW (ref 27–34)
MCHC RBC-ENTMCNC: 31.5 GM/DL — LOW (ref 32–36)
MCV RBC AUTO: 84.1 FL — SIGNIFICANT CHANGE UP (ref 80–100)
NRBC # BLD: 0 /100 WBCS — SIGNIFICANT CHANGE UP (ref 0–0)
PLATELET # BLD AUTO: 239 K/UL — SIGNIFICANT CHANGE UP (ref 150–400)
POTASSIUM SERPL-MCNC: 3.5 MMOL/L — SIGNIFICANT CHANGE UP (ref 3.5–5.3)
POTASSIUM SERPL-SCNC: 3.5 MMOL/L — SIGNIFICANT CHANGE UP (ref 3.5–5.3)
RBC # BLD: 5.09 M/UL — SIGNIFICANT CHANGE UP (ref 3.8–5.2)
RBC # FLD: 15.8 % — HIGH (ref 10.3–14.5)
SODIUM SERPL-SCNC: 136 MMOL/L — SIGNIFICANT CHANGE UP (ref 135–145)
WBC # BLD: 7.62 K/UL — SIGNIFICANT CHANGE UP (ref 3.8–10.5)
WBC # FLD AUTO: 7.62 K/UL — SIGNIFICANT CHANGE UP (ref 3.8–10.5)

## 2019-03-18 PROCEDURE — 76775 US EXAM ABDO BACK WALL LIM: CPT | Mod: 26

## 2019-03-18 PROCEDURE — 93970 EXTREMITY STUDY: CPT | Mod: 26

## 2019-03-18 PROCEDURE — 99233 SBSQ HOSP IP/OBS HIGH 50: CPT | Mod: GC

## 2019-03-18 PROCEDURE — 99232 SBSQ HOSP IP/OBS MODERATE 35: CPT

## 2019-03-18 PROCEDURE — 93923 UPR/LXTR ART STDY 3+ LVLS: CPT | Mod: 26

## 2019-03-18 PROCEDURE — 70551 MRI BRAIN STEM W/O DYE: CPT | Mod: 26

## 2019-03-18 RX ORDER — HYDROCORTISONE 1 %
1 OINTMENT (GRAM) TOPICAL
Qty: 0 | Refills: 0 | Status: DISCONTINUED | OUTPATIENT
Start: 2019-03-18 | End: 2019-03-19

## 2019-03-18 RX ORDER — SENNA PLUS 8.6 MG/1
1 TABLET ORAL DAILY
Qty: 0 | Refills: 0 | Status: DISCONTINUED | OUTPATIENT
Start: 2019-03-18 | End: 2019-03-19

## 2019-03-18 RX ORDER — DOCUSATE SODIUM 100 MG
100 CAPSULE ORAL DAILY
Qty: 0 | Refills: 0 | Status: DISCONTINUED | OUTPATIENT
Start: 2019-03-18 | End: 2019-03-19

## 2019-03-18 RX ADMIN — ENOXAPARIN SODIUM 40 MILLIGRAM(S): 100 INJECTION SUBCUTANEOUS at 12:06

## 2019-03-18 RX ADMIN — Medication 100 MILLIGRAM(S): at 12:06

## 2019-03-18 RX ADMIN — Medication 25 MILLIGRAM(S): at 18:17

## 2019-03-18 RX ADMIN — Medication 81 MILLIGRAM(S): at 12:06

## 2019-03-18 RX ADMIN — LOSARTAN POTASSIUM 100 MILLIGRAM(S): 100 TABLET, FILM COATED ORAL at 07:12

## 2019-03-18 RX ADMIN — Medication 25 MILLIGRAM(S): at 07:12

## 2019-03-18 RX ADMIN — Medication 2 CAPSULE(S): at 07:12

## 2019-03-18 RX ADMIN — Medication 1 APPLICATION(S): at 23:08

## 2019-03-18 RX ADMIN — SENNA PLUS 1 TABLET(S): 8.6 TABLET ORAL at 12:06

## 2019-03-18 RX ADMIN — ATORVASTATIN CALCIUM 40 MILLIGRAM(S): 80 TABLET, FILM COATED ORAL at 23:08

## 2019-03-18 NOTE — PROGRESS NOTE ADULT - ASSESSMENT
Patient is a 63 year old female from home, with PMHx of  morbidly obese, Pre- diabetic, HTN, CHICA ( on Cpap but non compliant) presented to ED from PCP office with High BP. Patient also reports of right side abdominal pain radiating to right groin region associated with dysuria. Patient has been on multiple hypertensive medications, complains of allergies to BB, diuretics, CCB, lisinopril, currently pt was on losartan, nifedipine and HCTZ/ Triamterene. pt reports she has been non- compliant with medication due to side effects. Patient has CHICA on Cpap but she was not using at home.       1- Chest pain [R07.9]  Patient presented with chest pain, sob, exertional dyspnea.  Trop 0.054->0.060->0.057, most likely spill troponin secondary to Hypertensive urgency.   Continue with aspirin, statin and metoprolol  Considering stress test as outpatient, due to patient's weight.    2- Hypertensive urgency:  Secondary to patient non compliance with medications, due to reported side effects.   Continue with metoprolol losartan, triamterene -HCTZ    3- Abnormal CT head:  Crowding of the posterior fossa and particularly at the level of foramen magnum consistent with Chiari malformation.  At bedside, patient states prior knowledge of this.  Unlikely any infarct, patient denies any focal weakness, only complains of generalized numbenss which is mos likely secondary to above Chiari 1.  Neurology input appreciated.       4 -Prediabetes   continue metformin post discharge.   A1c 6.4%  sliding scale  diabetic diet     5-CHICA on CPAP [G47.33]  Continue Bipap,   could be central secondary to Chiari 1 Malformation.     Prophylactic measure [Z29.9]  Lovenox for Dvt prophylaxis

## 2019-03-18 NOTE — PROGRESS NOTE ADULT - ASSESSMENT
Impression: No evidence of acute stroke; trace white matter changes likely related to hypertension age and obesity. Recommend continue aspirin, atorvastatin and antihypertensives. The Chiari I malformation is non-significant and an incidental abnormality  ( a red-herring). The far bigger problem is morbid obesity.

## 2019-03-18 NOTE — PROGRESS NOTE ADULT - ASSESSMENT
Mild left hydronephrosis.    Unlikely any infarct, patient denies any focal weakness, only complains of generalized numbenss which is mos likely secondary to above Chiari 1.    · Assessment	  Patient is a 63 year old female from home, with PMHx of  morbidly obese, Pre- diabetic, HTN, CHICA ( on Cpap but non compliant) presented to ED from PCP office with High BP. As per patient for last 3-4 days her SBP has been in 190's she went to see her PCP, advised her to go to hospital. Patient complains of chest discomfort, SOB, exertional dyspnea, b/l lower ext swelling and orthopnea. Patient also reports of right side abdominal pain radiating to right groin region associated with dysuria. Patient denies any h/o CVD or renal stones. Patient denies fever, chills, headaches, cough, nausea, vomiting, diarrhea, constipation, skin rashes, muscle or joint pains. Patient has been on multiple hypertensive medications, complains of allergies to BB, diuretics, CCB, lisinopril, currently pt was on losartan, nifedipine and HCTZ/ Triamterene. pt reports she has been non- compliant with medication due to side effects. Patient has CHICA on Cpap but she was not using at home.       Chest pain [R07.9]  Patient presented with chest pain, sob, exertional dyspnea. On admission pt was afebrile, /68, Hr 84, RR 18, no leucocytosis, normal electrolytes and renal functions. Admitted to ACS r/o. CXR no congestion or consolidation. EKG NSR with no st t wave changes. SUPRIYA score 2 pt has 8% mortality risk. Trop 0.054->0.060->0.057. BNP, TSH, A1c, lipid panel WNL. CT A/P - No bowel obstruction or grossly thickened bowel wall. Colonic diverticulosis without evidence for diverticulitis. TTE WNL.  - Cardiology's consult Dr Miranda  - Continue with aspirin, statin and BB  - Pt c/o persistent and significant intermittent RLQ abdominal pain radiating down to R inguinal and inner thigh areas. Although CT A/P with oral and Iv contrast did not show renal stones  -Renal US showed mild left hydronephrosis although showed no evidence of calculus  - Stress likely as an outpt given patient's weight  - B/l LE varicose veins with TTP noted  - F/u Doppler b/l LE  - F/u PVR with ABIs    DM (diabetes mellitus) [E11.9]  As per patient she is pre- diabetic   last A1c 6.4%  - sliding scale  - diabetic diet   - hba1c 6.3    Hypertension [I10]  Likely from CHICA and poor compliance to the medications. Currently BP better controlled, goal should be <130/801 on diabetes patients. Dizziness and headache in patient with HTN urgency/emergency concerning for possible stroke. CT head - Crowding of the posterior fossa and particularly at the level of foramen magnum, possible posterior fossa lesion or tonsillar ectopia. No acute hemorrhage or CT evidence of an acute territorial infarction.   -Pt needs to lose her weight and do regular exercise. CHICA should be also better controlled.  -Continue with metoprolol losartan, triamterene -HCTZ  -MRI brain to evaluate for stroke, if there is stroke can complete stroke w/p  -MRI performed today 3/18, confirmed with MRI, will f/u with readings  -The patient can follow up in the Neurology clinic ([place] or [place]) for long-term management of Chiari I malformation and b/l hand numbness, which may be related or may be due to superimposed b/l carpal tunnel syndrome  -Outpatient EMG/NCS to help distinguish between a cervical spinal or peripheral nerve entrapment etiology    CHICA on CPAP [G47.33]  bakari Cpap    Prophylactic measure [Z29.9]  Lovenox for Dvt prophylaxis     CHICA (obstructive sleep apnea) [327.23]  Advised to use machine but pt reports not being able to afford one

## 2019-03-19 ENCOUNTER — TRANSCRIPTION ENCOUNTER (OUTPATIENT)
Age: 64
End: 2019-03-19

## 2019-03-19 VITALS
SYSTOLIC BLOOD PRESSURE: 149 MMHG | DIASTOLIC BLOOD PRESSURE: 79 MMHG | HEART RATE: 80 BPM | TEMPERATURE: 98 F | RESPIRATION RATE: 17 BRPM | OXYGEN SATURATION: 97 %

## 2019-03-19 LAB
ANION GAP SERPL CALC-SCNC: 3 MMOL/L — LOW (ref 5–17)
BUN SERPL-MCNC: 18 MG/DL — SIGNIFICANT CHANGE UP (ref 7–18)
CALCIUM SERPL-MCNC: 9 MG/DL — SIGNIFICANT CHANGE UP (ref 8.4–10.5)
CHLORIDE SERPL-SCNC: 102 MMOL/L — SIGNIFICANT CHANGE UP (ref 96–108)
CO2 SERPL-SCNC: 32 MMOL/L — HIGH (ref 22–31)
CREAT SERPL-MCNC: 0.99 MG/DL — SIGNIFICANT CHANGE UP (ref 0.5–1.3)
GLUCOSE SERPL-MCNC: 110 MG/DL — HIGH (ref 70–99)
HCT VFR BLD CALC: 42.7 % — SIGNIFICANT CHANGE UP (ref 34.5–45)
HGB BLD-MCNC: 13.2 G/DL — SIGNIFICANT CHANGE UP (ref 11.5–15.5)
MCHC RBC-ENTMCNC: 26.2 PG — LOW (ref 27–34)
MCHC RBC-ENTMCNC: 30.9 GM/DL — LOW (ref 32–36)
MCV RBC AUTO: 84.7 FL — SIGNIFICANT CHANGE UP (ref 80–100)
NRBC # BLD: 0 /100 WBCS — SIGNIFICANT CHANGE UP (ref 0–0)
PLATELET # BLD AUTO: 233 K/UL — SIGNIFICANT CHANGE UP (ref 150–400)
POTASSIUM SERPL-MCNC: 3.7 MMOL/L — SIGNIFICANT CHANGE UP (ref 3.5–5.3)
POTASSIUM SERPL-SCNC: 3.7 MMOL/L — SIGNIFICANT CHANGE UP (ref 3.5–5.3)
RBC # BLD: 5.04 M/UL — SIGNIFICANT CHANGE UP (ref 3.8–5.2)
RBC # FLD: 15.9 % — HIGH (ref 10.3–14.5)
SODIUM SERPL-SCNC: 137 MMOL/L — SIGNIFICANT CHANGE UP (ref 135–145)
WBC # BLD: 7.33 K/UL — SIGNIFICANT CHANGE UP (ref 3.8–10.5)
WBC # FLD AUTO: 7.33 K/UL — SIGNIFICANT CHANGE UP (ref 3.8–10.5)

## 2019-03-19 PROCEDURE — 83036 HEMOGLOBIN GLYCOSYLATED A1C: CPT

## 2019-03-19 PROCEDURE — 80061 LIPID PANEL: CPT

## 2019-03-19 PROCEDURE — 93923 UPR/LXTR ART STDY 3+ LVLS: CPT

## 2019-03-19 PROCEDURE — 81001 URINALYSIS AUTO W/SCOPE: CPT

## 2019-03-19 PROCEDURE — 93970 EXTREMITY STUDY: CPT

## 2019-03-19 PROCEDURE — 97161 PT EVAL LOW COMPLEX 20 MIN: CPT

## 2019-03-19 PROCEDURE — 80053 COMPREHEN METABOLIC PANEL: CPT

## 2019-03-19 PROCEDURE — 84484 ASSAY OF TROPONIN QUANT: CPT

## 2019-03-19 PROCEDURE — 83605 ASSAY OF LACTIC ACID: CPT

## 2019-03-19 PROCEDURE — 84443 ASSAY THYROID STIM HORMONE: CPT

## 2019-03-19 PROCEDURE — 85027 COMPLETE CBC AUTOMATED: CPT

## 2019-03-19 PROCEDURE — 82550 ASSAY OF CK (CPK): CPT

## 2019-03-19 PROCEDURE — 83690 ASSAY OF LIPASE: CPT

## 2019-03-19 PROCEDURE — 99239 HOSP IP/OBS DSCHRG MGMT >30: CPT

## 2019-03-19 PROCEDURE — 82607 VITAMIN B-12: CPT

## 2019-03-19 PROCEDURE — 82962 GLUCOSE BLOOD TEST: CPT

## 2019-03-19 PROCEDURE — 70450 CT HEAD/BRAIN W/O DYE: CPT

## 2019-03-19 PROCEDURE — 70551 MRI BRAIN STEM W/O DYE: CPT

## 2019-03-19 PROCEDURE — 99285 EMERGENCY DEPT VISIT HI MDM: CPT | Mod: 25

## 2019-03-19 PROCEDURE — 82553 CREATINE MB FRACTION: CPT

## 2019-03-19 PROCEDURE — 93306 TTE W/DOPPLER COMPLETE: CPT

## 2019-03-19 PROCEDURE — 96374 THER/PROPH/DIAG INJ IV PUSH: CPT

## 2019-03-19 PROCEDURE — 87086 URINE CULTURE/COLONY COUNT: CPT

## 2019-03-19 PROCEDURE — 76775 US EXAM ABDO BACK WALL LIM: CPT

## 2019-03-19 PROCEDURE — 74177 CT ABD & PELVIS W/CONTRAST: CPT

## 2019-03-19 PROCEDURE — 84100 ASSAY OF PHOSPHORUS: CPT

## 2019-03-19 PROCEDURE — 82803 BLOOD GASES ANY COMBINATION: CPT

## 2019-03-19 PROCEDURE — 93005 ELECTROCARDIOGRAM TRACING: CPT

## 2019-03-19 PROCEDURE — 36415 COLL VENOUS BLD VENIPUNCTURE: CPT

## 2019-03-19 PROCEDURE — 83880 ASSAY OF NATRIURETIC PEPTIDE: CPT

## 2019-03-19 PROCEDURE — 80048 BASIC METABOLIC PNL TOTAL CA: CPT

## 2019-03-19 PROCEDURE — 83735 ASSAY OF MAGNESIUM: CPT

## 2019-03-19 PROCEDURE — 94660 CPAP INITIATION&MGMT: CPT

## 2019-03-19 RX ORDER — NIFEDIPINE 30 MG
1 TABLET, EXTENDED RELEASE 24 HR ORAL
Qty: 0 | Refills: 0 | COMMUNITY

## 2019-03-19 RX ORDER — PREGABALIN 225 MG/1
1000 CAPSULE ORAL DAILY
Qty: 0 | Refills: 0 | Status: DISCONTINUED | OUTPATIENT
Start: 2019-03-19 | End: 2019-03-19

## 2019-03-19 RX ORDER — METOPROLOL TARTRATE 50 MG
1 TABLET ORAL
Qty: 60 | Refills: 0
Start: 2019-03-19 | End: 2019-04-17

## 2019-03-19 RX ADMIN — SENNA PLUS 1 TABLET(S): 8.6 TABLET ORAL at 13:23

## 2019-03-19 RX ADMIN — Medication 25 MILLIGRAM(S): at 07:43

## 2019-03-19 RX ADMIN — ENOXAPARIN SODIUM 40 MILLIGRAM(S): 100 INJECTION SUBCUTANEOUS at 13:22

## 2019-03-19 RX ADMIN — Medication 81 MILLIGRAM(S): at 13:23

## 2019-03-19 RX ADMIN — Medication 100 MILLIGRAM(S): at 13:23

## 2019-03-19 RX ADMIN — Medication 1 APPLICATION(S): at 17:48

## 2019-03-19 RX ADMIN — PREGABALIN 1000 MICROGRAM(S): 225 CAPSULE ORAL at 13:23

## 2019-03-19 RX ADMIN — LOSARTAN POTASSIUM 100 MILLIGRAM(S): 100 TABLET, FILM COATED ORAL at 07:43

## 2019-03-19 RX ADMIN — Medication 1 APPLICATION(S): at 07:44

## 2019-03-19 RX ADMIN — Medication 2 CAPSULE(S): at 07:43

## 2019-03-19 RX ADMIN — Medication 25 MILLIGRAM(S): at 17:48

## 2019-03-19 NOTE — PROGRESS NOTE ADULT - ATTENDING COMMENTS
Patient seen and examined at bedside, feels ok  Had extensive discussion with the patient in term of importance of medication compliance, control of BP, risks of morbidity and mortality from uncontrolled BP, benefits of weight loss.  Physical exam:  Vital Signs Last 24 Hrs  T(C): 36.5 (19 Mar 2019 09:55), Max: 36.8 (18 Mar 2019 18:17)  T(F): 97.7 (19 Mar 2019 09:55), Max: 98.3 (18 Mar 2019 18:17)  HR: 70 (19 Mar 2019 09:55) (70 - 88)  BP: 155/64 (19 Mar 2019 09:55) (136/65 - 155/64)  BP(mean): --  RR: 17 (19 Mar 2019 09:55) (16 - 18)  SpO2: 100% (19 Mar 2019 09:55) (96% - 100%)  General: morbidly obese  Chest: decreased air entry bilateral consistent with COPD  Heart: S1 S2 normal  Abdomen: NT< ND  LE: bilateral LE edema  A/P  1- MOrbid obesity: she is considering Bariatric surgery   surgery to evaluate  2- HTN urgency: secondary to medication non compliance.   extesnive counselling given a above, she seems to understand.   continue current meds  3- CHICA< obesity hypoventilation syndrome:  needs to follow up with pulmonary post discharge, PFT outpatient  continue BIpap at night.  4- PVD: causing secondary darkish discoloration for bilateral LE  Leg elevation advised.   5- Chiari 1 malformation: stable  patient aware  6- vitamin b12 insufficiency: replace post discharge.    DC planning
Patient seen and examined at bedside, her son and daughters at her side.   Had long conversation with her, she is not compliant with her medications as she reports side effects. Had been on a lot of medications which were discontinued mostly for intolerance rather than allergy. She attributes bilateral numbness in her hands to taking the medications. I explained that staying with this blood pressure is predisposing her to having strokes, kidney failure and heart attacks, advised to tolerate if possible symptoms she has and still take the medications as to avoid morbidity and mortality.  Physical exam:  Vital Signs Last 24 Hrs  T(C): 37 (15 Mar 2019 14:29), Max: 37 (15 Mar 2019 14:29)  T(F): 98.6 (15 Mar 2019 14:29), Max: 98.6 (15 Mar 2019 14:29)  HR: 80 (15 Mar 2019 14:29) (66 - 80)  BP: 135/69 (15 Mar 2019 14:29) (126/87 - 175/68)  BP(mean): --  RR: 16 (15 Mar 2019 14:29) (16 - 20)  SpO2: 98% (15 Mar 2019 14:29) (96% - 99%)  General: morbid obesity  Chest: Poor air movement appreciated  LE: bilateral LE swelling +2  right>left   A/P  1- Hypertensive urgency:" secondary to non compliance with medication regimen  multiple listed allergies that are in fact intolerances.  States no allergy to metoprolol  counselling given in regards to morbidity and mortality if HTN left untreated, she understands.   continue current meds,   2- morbid obesity: nutritional consult for obesity and for HTN diet  3- Prediabetes: continue metformin post discharge  4- CHICA: weight management  will try either bipap or oxygen at night.
62 yo female admitted for hypertensive urgency with mildly elevated troponin. Cardio was consulted for possible stress test. Now bp is better controlled with losartan and Dyazide but still c/o intermittent dizziness. Neurology recommended MRI brain to r/o posterior cva. 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 Pt's bl upper extremity numbness to be followed up on outpt EMG/EEG.     Dr Cisneros
[place]

## 2019-03-19 NOTE — PROGRESS NOTE ADULT - ASSESSMENT
· Assessment	  Patient is a 63 year old female from home, with PMHx of  morbidly obese, Pre- diabetic, HTN, CHICA ( on Cpap but non compliant) presented to ED from PCP office with High BP. As per patient for last 3-4 days her SBP has been in 190's she went to see her PCP, advised her to go to hospital. Patient complains of chest discomfort, SOB, exertional dyspnea, b/l lower ext swelling and orthopnea. Patient also reports of right side abdominal pain radiating to right groin region associated with dysuria. Patient denies any h/o CVD or renal stones. Patient denies fever, chills, headaches, cough, nausea, vomiting, diarrhea, constipation, skin rashes, muscle or joint pains. Patient has been on multiple hypertensive medications, complains of allergies to BB, diuretics, CCB, lisinopril, currently pt was on losartan, nifedipine and HCTZ/ Triamterene. pt reports she has been non- compliant with medication due to side effects. Patient has CHICA on Cpap but she was not using at home.       Chest pain [R07.9]  Patient presented with chest pain, sob, exertional dyspnea. On admission pt was afebrile, /68, Hr 84, RR 18, no leucocytosis, normal electrolytes and renal functions. Admitted to ACS r/o. CXR no congestion or consolidation. EKG NSR with no st t wave changes. SUPRIYA score 2 pt has 8% mortality risk. Trop 0.054->0.060->0.057. BNP, TSH, A1c, lipid panel WNL. CT A/P - No bowel obstruction or grossly thickened bowel wall. Colonic diverticulosis without evidence for diverticulitis. TTE WNL.  - Cardiology's consult Dr Miranda  - Continue with aspirin, statin and BB  - Pt c/o persistent and significant intermittent RLQ abdominal pain radiating down to R inguinal and inner thigh areas. Although CT A/P with oral and Iv contrast did not show renal stones  -Renal US showed mild left hydronephrosis although showed no evidence of calculus  - Stress likely as an outpt given patient's weight  - B/l LE varicose veins with TTP noted  - F/u Doppler b/l LE  - F/u PVR with ABIs    DM (diabetes mellitus) [E11.9]  As per patient she is pre- diabetic   last A1c 6.4%  - sliding scale  - diabetic diet   - hba1c 6.3    Hypertension [I10]  Likely from CHICA and poor compliance to the medications. Currently BP better controlled, goal should be <130/801 on diabetes patients. Dizziness and headache in patient with HTN urgency/emergency concerning for possible stroke. CT head - Crowding of the posterior fossa and particularly at the level of foramen magnum, possible posterior fossa lesion or tonsillar ectopia. No acute hemorrhage or CT evidence of an acute territorial infarction.   -Pt needs to lose her weight and do regular exercise. CHICA should be also better controlled.  -Continue with metoprolol losartan, triamterene -HCTZ  -MRI brain to evaluate for stroke, if there is stroke can complete stroke w/p  -MRI performed today 3/18, confirmed with MRI, will f/u with readings  -The patient can follow up in the Neurology clinic ([facility] or [place]) for long-term management of Chiari I malformation and b/l hand numbness, which may be related or may be due to superimposed b/l carpal tunnel syndrome  -Outpatient EMG/NCS to help distinguish between a cervical spinal or peripheral nerve entrapment etiology    CHICA on CPAP [G47.33]  bakari Cpap    Prophylactic measure [Z29.9]  Lovenox for Dvt prophylaxis     CHICA (obstructive sleep apnea) [327.23]  Advised to use machine but pt reports not being able to afford one

## 2019-03-19 NOTE — DISCHARGE NOTE NURSING/CASE MANAGEMENT/SOCIAL WORK - NSDCPEPTCAREGIVEDUMATLIST _GEN_ALL_CORE
CC: f/u for high fever    Patient reports: she has been poorly interactive ,required fluid boluses for hypotension    REVIEW OF SYSTEMS:  All other review of systems negative (Comprehensive ROS): limited by dementia, poor cognitive status    Antimicrobials Day #  :day 2  meropenem  IVPB 1000 milliGRAM(s) IV Intermittent every 8 hours    Other Medications Reviewed    T(F): 100 (19 @ 11:55), Max: 102.8 (19 @ 03:01)  HR: 112 (19 @ 07:14)  BP: 90/63 (19 @ 07:14)  RR: 18 (19 @ 07:14)  SpO2: 95% (19 @ 07:14)  Wt(kg): --    PHYSICAL EXAM:  General: lethargic,, no acute distress  Eyes:  anicteric, no conjunctival injection, no discharge  Oropharynx: no lesions or injection 	  Neck: supple, without adenopathy  Lungs: clear, diminished at bases  Heart: regular rate and rhythm; no murmur, rubs or gallops  Abdomen: soft, nondistended, nontender, without mass or organomegaly, peg in place  Skin: stage 4 sacral decubitus and rt hip decubitus  Extremities: contracted  Neurologic: lethargic, poorly intractive    LAB RESULTS:                        8.8    33.47 )-----------( 412      ( 2019 09:42 )             29.6         142  |  109<H>  |  74<H>  ----------------------------<  203<H>  3.6   |  17<L>  |  1.04    Ca    8.2<L>      2019 07:16    TPro  6.9  /  Alb  3.7  /  TBili  0.3  /  DBili  x   /  AST  23  /  ALT  18  /  AlkPhos  92      LIVER FUNCTIONS - ( 2019 21:23 )  Alb: 3.7 g/dL / Pro: 6.9 g/dL / ALK PHOS: 92 U/L / ALT: 18 U/L / AST: 23 U/L / GGT: x           Urinalysis Basic - ( 2019 21:59 )    Color: Yellow / Appearance: Clear / S.023 / pH: x  Gluc: x / Ketone: Negative  / Bili: Negative / Urobili: Negative   Blood: x / Protein: 30 mg/dL / Nitrite: Negative   Leuk Esterase: Large / RBC: 17 /hpf / WBC 27 /HPF   Sq Epi: x / Non Sq Epi: 0 /hpf / Bacteria: Many      MICROBIOLOGY:  RECENT CULTURES:   @ 06:07 .Blood Blood-Peripheral Blood Culture PCR    Growth in aerobic bottle: Gram Positive Cocci in Clusters  Coag negative staph in 1/ bottles         @ 05:08 .Stool Feces     Testing in progress       @ 03:27 .Stool Feces     GI PCR Results: NOT detected  *******Please Note:*******  ,         @ 03:25 .Stool Feces     No enteric pathogens to date: Final culture pending    CDT negative      RADIOLOGY REVIEWED:  < from: Xray Chest 1 View- PORTABLE-Urgent (19 @ 23:13) >  IMPRESSION:   Clear lungs.    < end of copied text > Diabetes

## 2019-03-19 NOTE — PROGRESS NOTE ADULT - REASON FOR ADMISSION
sob, chest pain and right sided abdominal pain

## 2019-03-19 NOTE — DISCHARGE NOTE PROVIDER - NSDCFUADDINST_GEN_ALL_CORE_FT
Follow up in the Neurology clinic listed below for long-term management of Chiari I malformation and bilateral hand numbness, which may be related or may be due to superimposed bilateral carpal tunnel syndrome.  Outpatient EMG/NCS neurological studies will also be performed to help distinguish between a cervical spinal or peripheral nerve entrapment etiology.  Faby Garcia MD  Phone: [phone]   [facility]  -[address] [place] [address], [address] [place] [address] [place] [address]     Pt needs to lose her weight and do regular exercise. Please follow up with Dr. Saúl Aviles MD as an outpatient at for evaluation for need for bariatric surgery. His contact information is:  Phone: [phone]   [address] [place] [address], [place] [id number]

## 2019-03-19 NOTE — DISCHARGE NOTE PROVIDER - NSDCCPCAREPLAN_GEN_ALL_CORE_FT
PRINCIPAL DISCHARGE DIAGNOSIS  Diagnosis: Fall  Assessment and Plan of Treatment: Patient (pt) presented with chest pain, sob, exertional dyspnea. On admission pt was afebrile, /68, Hr 84, RR 18, no leucocytosis, normal electrolytes and renal functions. Admitted to acute coronary artery ule out. Chest X-ray showed no congestion or consolidation. EKG showed normal sinus rhythm with no st or t wave changes. SUPRIYA score 2 pt has 8% mortality risk. Cardiac enzymes were within normal range. BNP, TSH, Hemoglobin A1c, lipid panel all normal. CT abdomen/pelvis showed no bowel obstruction or grossly thickened bowel wall. Colonic diverticulosis without evidence for diverticulitis. Echocardiogram was normal.  Cardiologist Dr Miranda followed the patient.  Patient was continued with aspirin, statin and BB.  Pt complained of persistent and significant intermittent right lower quadrant abdominal pain radiating down to right inguinal and inner thigh areas. Although CT abdomen/pelvis with oral and IV contrast did not show renal stones.  Renal ultrasound showed mild left hydronephrosis although showed no evidence of calculus.  Stress is to be done as outpatient given patient's weight not supported by the stress test machine in our hospital.  Bilateral lower extremity varicose veins with tenderness to palpation noted.  Venous doppler of bilateral legs did not show any evidence of DVT or clots.  Arterial study of the bilateral legs with PVR with ABIs attempted but because of the girth of the patent’s calves, it could not be performed as an inpatient. Please pursue the study as an outpatient to document the good circulation of the bilateral legs.      SECONDARY DISCHARGE DIAGNOSES  Diagnosis: Overweight  Assessment and Plan of Treatment: Pt needs to lose her weight and do regular exercise. Please follow up with Dr. Saúl Aviles MD as an outpatient at for evaluation for need for bariatric surgery. His contact information is:  Phone: [phone] [address] [place] [address]       Diagnosis: CHICA on CPAP  Assessment and Plan of Treatment: Pt presented with history of CHICA. Please continue to use CPAP at nighttime during sleep. Please follow up with your PCP within 1 week of discharge.    Diagnosis: Hypertension  Assessment and Plan of Treatment: Follow up in the Neurology clinic listed below for long-term management of Chiari I malformation and bilateral hand numbness, which may be related or may be due to superimposed bilatearl carpal tunnel syndrome.  Outpatient EMG/NCS neurological studies will also be performed to help distinguish between a cervical spinal or peripheral nerve entrapment etiology.  Faby Garcia MD  Phone: [phone]   [facility] [address], [facility] Floor [address] [place] [id number]    Diagnosis: Peripheral neuropathy  Assessment and Plan of Treatment: Follow up in the Neurology clinic listed below for long-term management of Chiari I malformation and bilateral hand numbness, which may be related or may be due to superimposed bilatearl carpal tunnel syndrome.  Outpatient EMG/NCS neurological studies will also be performed to help distinguish between a cervical spinal or peripheral nerve entrapment etiology.  Faby Garcia MD  Phone: [phone]   [facility]  [address] [facility], [facility] [place] [address]

## 2019-03-19 NOTE — PROGRESS NOTE ADULT - PROVIDER SPECIALTY LIST ADULT
Internal Medicine
Neurology
Hospitalist

## 2019-03-19 NOTE — DISCHARGE NOTE PROVIDER - NSDCFUADDAPPT_GEN_ALL_CORE_FT
Please pursue cardiac stress test to rule out ischemia and ABR with PVRs to document good arterial circulation of your bilateral legs given your current complaints of leg pains on movement and palpation.

## 2019-03-19 NOTE — PROGRESS NOTE ADULT - SUBJECTIVE AND OBJECTIVE BOX
Patient is a 63y old  Female who presents with a chief complaint of sob, chest pain and right sided abdominal pain (15 Mar 2019 21:22)    HPI:  Patient is a 63 year old female from home, with PMHx of  morbidly obese, Pre- diabetic, HTN, CHICA ( on Cpap but non compliant) presented to ED from PCP office with High BP. As per patient for last 3-4 days her SBP has been in 190's she went to see her PCP, advised her to go to hospital. Patient complains of chest discomfort, SOB, exertional dyspnea, b/l lower ext swelling and orthopnea. Patient also reports of right side abdominal pain radiating to right groin region associated with dysuria. Patient denies any h/o CVD or renal stones. Patient denies fever, chills, headaches, cough, nausea, vomiting, diarrhea, constipation, skin rashes, muscle or joint pains. Patient has been on multiple hypertensive medications, complains of allergies to BB, diuretics, CCB, lisinopril, currently pt was on losartan, nifedipine and HCTZ/ Triamterene. pt reports she has been non- compliant with medication due to side effects. Patient has CHICA on Cpap but she was not using at home. (14 Mar 2019 20:47)    Today:  Pt notes still having pain on the right upper and lower quadrant when urinating and defecating. Notes burning on urination. no fevers, no vomiting, no diarrhea, no sob.     PAST MEDICAL & SURGICAL HISTORY:  Obesity, morbid  Seasonal allergies  Bilateral carpal tunnel syndrome  DM (diabetes mellitus)  Uterine polyp  Postmenopausal bleeding  Asthma  HTN (hypertension)  CHICA (obstructive sleep apnea)  Tubal ligation status  Cellulitis: h/o   both legs  S/P colonoscopy: 2013  S/P D&C: 2012  H/O  section:  &amp;   History of tonsillectomy    MEDICATIONS  (STANDING):  aspirin  chewable 81 milliGRAM(s) Oral daily  atorvastatin 40 milliGRAM(s) Oral at bedtime  enoxaparin Injectable 40 milliGRAM(s) SubCutaneous daily  insulin lispro (HumaLOG) corrective regimen sliding scale   SubCutaneous three times a day before meals  insulin lispro (HumaLOG) corrective regimen sliding scale   SubCutaneous at bedtime  losartan 100 milliGRAM(s) Oral daily  metoprolol tartrate 25 milliGRAM(s) Oral two times a day  triamterene 37.5 mG/hydrochlorothiazide 25 mG Capsule 2 Capsule(s) Oral daily    EXAM:  Vital Signs Last 24 Hrs  T(C): 36.8 (16 Mar 2019 14:57), Max: 36.8 (16 Mar 2019 14:57)  T(F): 98.3 (16 Mar 2019 14:57), Max: 98.3 (16 Mar 2019 14:57)  HR: 78 (16 Mar 2019 14:57) (69 - 84)  BP: 158/83 (16 Mar 2019 14:57) (126/55 - 172/67)  BP(mean): 79 (16 Mar 2019 05:11) (79 - 79)  RR: 18 (16 Mar 2019 14:57) (15 - 18)  SpO2: 97% (16 Mar 2019 14:57) (96% - 100%)    PHYSICAL EXAM:  Constitutional: awake   Neck: supple  Back: non tender. no scoliosis.   Respiratory: bilaterally clear to auscultation, no wheezing, no rhonchi, no crackles, no decreased air entry  Cardiovascular: s1s2, rrr, no murmurs.   Gastrointestinal: soft, non tender, +bowel sounds, no rebound, no guarding.    Extremities: +v edema.   Neurological: alert and oriented to person, date and place.   Skin: intact no rash, warm to touch.   Musculoskeletal: moves all 4 extremities  Psychiatric: no homicidal, suicidal ideation. appropriate affect.     LABS:  Urinalysis Basic - ( 14 Mar 2019 18:09 )    Color: Yellow / Appearance: Clear / S.010 / pH: x  Gluc: x / Ketone: Negative  / Bili: Negative / Urobili: Negative   Blood: x / Protein: Negative / Nitrite: Negative   Leuk Esterase: Negative / RBC: 0-2 /HPF / WBC 0-2 /HPF   Sq Epi: x / Non Sq Epi: Few /HPF / Bacteria: Trace /HPF    LIVER FUNCTIONS - ( 14 Mar 2019 18:09 )  Alb: 3.6 g/dL / Pro: 8.3 g/dL / ALK PHOS: 97 U/L / ALT: 39 U/L DA / AST: 21 U/L / GGT: x                               13.8   6.57  )-----------( 236      ( 16 Mar 2019 06:10 )             44.5   03-16  139  |  104  |  21<H>  ----------------------------<  121<H>  4.3   |  31  |  1.05  Ca    8.7      16 Mar 2019 06:10  Phos  4.1     03-15  Mg     2.5     03-15  TPro  8.3  /  Alb  3.6  /  TBili  0.5  /  DBili  x   /  AST  21  /  ALT  39  /  AlkPhos  97  03-14    CARDIAC MARKERS ( 15 Mar 2019 08:48 )  0.057 ng/mL / x     / 150 U/L / x     / 1.2 ng/mL  CARDIAC MARKERS ( 15 Mar 2019 00:30 )  0.060 ng/mL / x     / 155 U/L / x     / <1.0 ng/mL  CARDIAC MARKERS ( 14 Mar 2019 18:09 )  0.054 ng/mL / x     / x     / x     / x        Chart reviewed.   Labs reviewed.  Imaging reviewed.   Plan discussed with consultants.
INTERVAL HPI/OVERNIGHT EVENTS:  Improved sensorium and mobility    Patient is a 63 year old female from home, with PMHx of  morbidly obese, Pre- diabetic, HTN, CHICA ( on Cpap but non compliant) presented to ED from PCP office with High BP. As per patient for last 3-4 days her SBP has been in 190's she went to see her PCP, advised her to go to hospital, neurology called for abnormal CTH finding.  Patient has occational headaches headaches and has been having a headache with this BP.  She has been also dizzy.      Patient has been on multiple hypertensive medications, complains of allergies to BB, diuretics, CCB, lisinopril, currently pt was on losartan, nifedipine and HCTZ/ Triamterene. pt reports she has been non- compliant with medication due to side effects. Patient has CHICA on Cpap but she was not using at home. (14 Mar 2019 20:47)  HEALTH ISSUES - PROBLEM Dx:  Prophylactic measure  CHICA on CPAP  DM (diabetes mellitus)  Hypertension  Chest pain          MEDICATIONS  (STANDING):  aspirin  chewable 81 milliGRAM(s) Oral daily  atorvastatin 40 milliGRAM(s) Oral at bedtime  docusate sodium 100 milliGRAM(s) Oral daily  enoxaparin Injectable 40 milliGRAM(s) SubCutaneous daily  insulin lispro (HumaLOG) corrective regimen sliding scale   SubCutaneous three times a day before meals  insulin lispro (HumaLOG) corrective regimen sliding scale   SubCutaneous at bedtime  losartan 100 milliGRAM(s) Oral daily  metoprolol tartrate 25 milliGRAM(s) Oral two times a day  senna 1 Tablet(s) Oral daily  triamterene 37.5 mG/hydrochlorothiazide 25 mG Capsule 2 Capsule(s) Oral daily    MEDICATIONS  (PRN):      Allergies    amlodipine (Short breath)  chlorthalidone (Anaphylaxis)  clonidine (Anaphylaxis)  diltiazem (Anaphylaxis)  furosemide (Short breath)  Lasix (Pruritus)  lisinopril (Anaphylaxis)  nebivolol (Anaphylaxis)  shellfish (Hives)    Intolerances        REVIEW OF SYSTEMS:    CONSTITUTIONAL: No weakness, fatigue, malaise, fevers or chills, no weight change, appetite change  EYES: No visual changes; No double vision,  No vertigo, eye pain  Ears: no otalgia, no otorhea, no hearing loss, tinnitus  Nose: no epistaxis, rhinorrhea, post-discharge, sinus pressure  Throat: no throat pain, no oral lesions, tooth pain   NECK: No pain or stiffness  RESPIRATORY: No cough (productive or dry), wheezing, hemoptysis; No shortness of breath, orthnopnea, PND, NICOLE, snoring,  CARDIOVASCULAR: No chest pain or palpitations, no leg edema, no claudication    GASTROINTESTINAL: No abdominal or epigastric pain. No nausea, vomiting, or hematemesis; No diarrhea or constipation. No melena or hematochezia.  GENITOURINARY: No dysuria, frequency, urgency or hematuria, no pelvic pain, urinary incontinence, urgency  Muscloskeletal: no joints or muscle pain, no swelling in joints or muscles  NEUROLOGICAL: No numbness or weakness, headache, memory loss, seizures, dizziness, vertigo, syncope, ataxia  SKIN: No pruritis, rashes, lesions or new moles  Psych: No anxiety, sadness, insomnia, suicide thoughts  Endocrine: No Heat or Cold intolerance, polydipsia, polyphagia  Heme/Lymph: no LN enlargement, no easy bruising or bleeding         Vital Signs Last 24 Hrs  T(C): 36.6 (18 Mar 2019 14:40), Max: 36.6 (18 Mar 2019 14:40)  T(F): 97.8 (18 Mar 2019 14:40), Max: 97.8 (18 Mar 2019 14:40)  HR: 81 (18 Mar 2019 14:40) (71 - 86)  BP: 137/60 (18 Mar 2019 14:40) (121/47 - 137/60)  BP(mean): --  RR: 16 (18 Mar 2019 14:40) (16 - 16)  SpO2: 98% (18 Mar 2019 14:40) (98% - 100%)    PHYSICAL EXAM:    Alert, awake, oriented in time, place, and person with normal immediate and 5 minute recall, fluent speech with normal naming, repetition and comprehension, Pupils are equal and reactive to light and accomodation. Visual fields are full by confrontation testing. Funduscopy reveals normal discs and retina. Extraocular movements are normal without nystagmus. Facial sensations, jaw strength, facial movements, hearing, palate, neck, shoulder and tongue are normal and symmetrical. Sensation for touch, pin, temperature and vibration, position sense are normal and symmetrical in the extremities and /or the trunk. Tone is normal in the extremities. Strength is 5/5. Muscle spindle reflexes (DTR) are normal. Plantar responses are flexor. Finger-to-nose and heel-to-shin are normal. Romberg sign is normal; Heel-walking and toe-walking are normal. Tandem gait is unsteady.      LABS:                        13.5   7.62  )-----------( 239      ( 18 Mar 2019 07:15 )             42.8     03-18    136  |  101  |  18  ----------------------------<  105<H>  3.5   |  31  |  0.89    Ca    8.9      18 Mar 2019 07:15            RADIOLOGY & ADDITIONAL TESTS:  < from: MR Head No Cont (03.18.19 @ 15:51) >  EXAM:  MR BRAIN                            PROCEDURE DATE:  03/18/2019          INTERPRETATION:  MRI brain without contrast    Comparison CT performed 4 days prior    History headache and dizziness    There is metallic induced artifact related to material in the left   posterior scalp. There is no mass effect, cortical edema or   hydrocephalus. There is trace scattered increased T2 signal in the   subcortical white matter in a frontal distribution. Cortical volume is   maintained. The cerebellar tonsils are tapered and extend 7 mm below the   foramen magnum with mild brainstem crowding. The diffusion-weighted   sequence is severely impacted by the artifact with the left posterior   temporal and parietal regions completely obscured with partial limitation   of the medial right occipital lobe and left posterior fossa. There is no   evidence of diffusion restriction to suggest acute infarct in the   unaffected areas. The gradient echo sequence is similarly compromised   without evidence ofprevious parenchymal hemorrhage in the remainder of   the brain. There is an empty sella without expansion. The orbital   contents are grossly normal.    IMPRESSION:  Limitations secondary to artifact as above  Chiari I malformation. Trace white matter change likely representing   migraines or chronic microvascular ischemic process. Empty sella. No   acute findings.        DENISSE JIANG M.D., ATTENDING RADIOLOGIST  This document has been electronically signed. Mar 18 2019  4:03PM          < end of copied text >
PGY 1 Note discussed with supervising resident and primary attending    Patient is a 63y old  Female who presents with a chief complaint of sob, chest pain and right sided abdominal pain (15 Mar 2019 10:58)      INTERVAL HPI/OVERNIGHT EVENTS: No acute events overnight, remains afebrile; HD stable, H/H stable, WBC WNL  HbA1C 6.3  Tr 0.054->0.060->0.057. downtrending  Pt reports improvement of CP, SOB, exertional dyspnea.    Pt c/o persistent and significant intermittent RLQ abdominal pain radiating down to R inguinal and inner thigh areas. Although CT A/P with oral and Iv contrast did not show renal stones, renal US ordered to r/o nephrolithiasis given typical preesntation of renal colic.      MEDICATIONS  (STANDING):  aspirin  chewable 81 milliGRAM(s) Oral daily  atorvastatin 40 milliGRAM(s) Oral at bedtime  enoxaparin Injectable 40 milliGRAM(s) SubCutaneous daily  insulin lispro (HumaLOG) corrective regimen sliding scale   SubCutaneous three times a day before meals  insulin lispro (HumaLOG) corrective regimen sliding scale   SubCutaneous at bedtime  losartan 100 milliGRAM(s) Oral daily  metoprolol tartrate 25 milliGRAM(s) Oral two times a day  triamterene 37.5 mG/hydrochlorothiazide 25 mG Capsule 2 Capsule(s) Oral daily    MEDICATIONS  (PRN):      __________________________________________________  REVIEW OF SYSTEMS:    CONSTITUTIONAL: No fever,   EYES: no acute visual disturbances  NECK: No pain or stiffness  RESPIRATORY: No cough; No shortness of breath  CARDIOVASCULAR: No chest pain, no palpitations  GASTROINTESTINAL: No pain. No nausea or vomiting; No diarrhea   NEUROLOGICAL: No headache or tremors; Numbness of bilateral hands, chronic and stable  MUSCULOSKELETAL: No joint pain, no muscle pain; RUQ pain radiating down to groin/inner thigh area  GENITOURINARY: no dysuria, no frequency, no hesitancy      Vital Signs Last 24 Hrs  T(C): 36.7 (15 Mar 2019 11:25), Max: 36.8 (14 Mar 2019 15:54)  T(F): 98 (15 Mar 2019 11:25), Max: 98.3 (14 Mar 2019 15:54)  HR: 66 (15 Mar 2019 11:25) (66 - 84)  BP: 138/66 (15 Mar 2019 11:25) (126/87 - 175/68)  BP(mean): --  RR: 16 (15 Mar 2019 11:25) (16 - 20)  SpO2: 99% (15 Mar 2019 11:25) (96% - 99%)    ________________________________________________  PHYSICAL EXAM:    GENERAL: NAD  HEENT: Normocephalic;  conjunctivae and sclerae clear; moist mucous membranes;   NECK : supple  CHEST/LUNG: Clear to auscultation bilaterally with good air entry   HEART: S1 S2  regular; no murmurs, gallops or rubs  ABDOMEN: Soft, Nondistended; Bowel sounds present; tenderness to RLQ  EXTREMITIES: no cyanosis; no edema; no calf tenderness  SKIN: warm and dry; no rash  NERVOUS SYSTEM:  Awake and alert; no new deficits; numbness on bilateral hands, stable    _________________________________________________  LABS:                        13.5   7.73  )-----------( 237      ( 15 Mar 2019 06:36 )             42.9     03-15    139  |  101  |  15  ----------------------------<  104<H>  3.7   |  32<H>  |  0.91    Ca    8.7      15 Mar 2019 06:36  Phos  4.1     03-15  Mg     2.5     03-15    TPro  8.3  /  Alb  3.6  /  TBili  0.5  /  DBili  x   /  AST  21  /  ALT  39  /  AlkPhos  97  03-14      Urinalysis Basic - ( 14 Mar 2019 18:09 )    Color: Yellow / Appearance: Clear / S.010 / pH: x  Gluc: x / Ketone: Negative  / Bili: Negative / Urobili: Negative   Blood: x / Protein: Negative / Nitrite: Negative   Leuk Esterase: Negative / RBC: 0-2 /HPF / WBC 0-2 /HPF   Sq Epi: x / Non Sq Epi: Few /HPF / Bacteria: Trace /HPF      CAPILLARY BLOOD GLUCOSE      POCT Blood Glucose.: 102 mg/dL (15 Mar 2019 11:56)  POCT Blood Glucose.: 96 mg/dL (15 Mar 2019 07:52)  POCT Blood Glucose.: 94 mg/dL (14 Mar 2019 16:02)        RADIOLOGY & ADDITIONAL TESTS:    Imaging Personally Reviewed:  YES    Consultant(s) Notes Reviewed:   YES    Care Discussed with Consultants :     Plan of care was discussed with patient and /or primary care giver; all questions and concerns were addressed and care was aligned with patient's wishes.
PGY 1 Note discussed with supervising resident and primary attending    Patient is a 63y old  Female who presents with a chief complaint of sob, chest pain and right sided abdominal pain (16 Mar 2019 16:24)      INTERVAL HPI/OVERNIGHT EVENTS: No acute events overnight, remains afebrile; HD stable  CP still randomly occurs unrelated to exertion or food intake but improving per pt  Engorged veins in b/l legs with tenderness on palpation noted. Doppler b/l LE ordered.    MEDICATIONS  (STANDING):  aspirin  chewable 81 milliGRAM(s) Oral daily  atorvastatin 40 milliGRAM(s) Oral at bedtime  enoxaparin Injectable 40 milliGRAM(s) SubCutaneous daily  insulin lispro (HumaLOG) corrective regimen sliding scale   SubCutaneous three times a day before meals  insulin lispro (HumaLOG) corrective regimen sliding scale   SubCutaneous at bedtime  losartan 100 milliGRAM(s) Oral daily  metoprolol tartrate 25 milliGRAM(s) Oral two times a day  triamterene 37.5 mG/hydrochlorothiazide 25 mG Capsule 2 Capsule(s) Oral daily    MEDICATIONS  (PRN):      __________________________________________________  REVIEW OF SYSTEMS:    CONSTITUTIONAL: No fever,   EYES: no acute visual disturbances  NECK: No pain or stiffness  RESPIRATORY: No cough; No shortness of breath  CARDIOVASCULAR: No chest pain, no palpitations  GASTROINTESTINAL: No pain. No nausea or vomiting; No diarrhea   NEUROLOGICAL: No headache or tremors; Numbness of bilateral hands, chronic and stable  MUSCULOSKELETAL: No joint pain, no muscle pain; RUQ pain radiating down to groin/inner thigh area  GENITOURINARY: no dysuria, no frequency, no hesitancy        Vital Signs Last 24 Hrs  T(C): 36.6 (17 Mar 2019 11:15), Max: 36.8 (16 Mar 2019 14:57)  T(F): 97.8 (17 Mar 2019 11:15), Max: 98.3 (16 Mar 2019 14:57)  HR: 71 (17 Mar 2019 11:15) (70 - 78)  BP: 152/78 (17 Mar 2019 11:15) (130/55 - 158/83)  BP(mean): --  RR: 16 (17 Mar 2019 11:15) (16 - 18)  SpO2: 98% (17 Mar 2019 11:15) (97% - 100%)    ________________________________________________  PHYSICAL EXAM:    GENERAL: NAD  HEENT: Normocephalic;  conjunctivae and sclerae clear; moist mucous membranes;   NECK : supple  CHEST/LUNG: Clear to auscultation bilaterally with good air entry   HEART: S1 S2  regular; no murmurs, gallops or rubs  ABDOMEN: Soft, Nondistended; Bowel sounds present; tenderness to RLQ  EXTREMITIES: no cyanosis; no edema; no calf tenderness  SKIN: warm and dry; no rash  NERVOUS SYSTEM:  Awake and alert; no new deficits; numbness on bilateral hands, stable    _________________________________________________  LABS:                        13.8   6.57  )-----------( 236      ( 16 Mar 2019 06:10 )             44.5     03-16    139  |  104  |  21<H>  ----------------------------<  121<H>  4.3   |  31  |  1.05    Ca    8.7      16 Mar 2019 06:10          CAPILLARY BLOOD GLUCOSE      POCT Blood Glucose.: 108 mg/dL (17 Mar 2019 08:46)  POCT Blood Glucose.: 212 mg/dL (16 Mar 2019 21:06)  POCT Blood Glucose.: 114 mg/dL (16 Mar 2019 16:38)        RADIOLOGY & ADDITIONAL TESTS:    Imaging Personally Reviewed:  YES    Consultant(s) Notes Reviewed:   YES    Care Discussed with Consultants :     Plan of care was discussed with patient and /or primary care giver; all questions and concerns were addressed and care was aligned with patient's wishes.
PGY 1 Note discussed with supervising resident and primary attending    Patient is a 63y old  Female who presents with a chief complaint of sob, chest pain and right sided abdominal pain (18 Mar 2019 12:20)      INTERVAL HPI/OVERNIGHT EVENTS: No acute events overnight, remains afebrile; HD stable, H/H stable, WBC WNL  WBC 10->11->15.6, will continue to monitor, pt however remains afebrile  Cr 2.22->2.15 (do not known patient's baseline Cr)    MEDICATIONS  (STANDING):  aspirin  chewable 81 milliGRAM(s) Oral daily  atorvastatin 40 milliGRAM(s) Oral at bedtime  docusate sodium 100 milliGRAM(s) Oral daily  enoxaparin Injectable 40 milliGRAM(s) SubCutaneous daily  insulin lispro (HumaLOG) corrective regimen sliding scale   SubCutaneous three times a day before meals  insulin lispro (HumaLOG) corrective regimen sliding scale   SubCutaneous at bedtime  losartan 100 milliGRAM(s) Oral daily  metoprolol tartrate 25 milliGRAM(s) Oral two times a day  senna 1 Tablet(s) Oral daily  triamterene 37.5 mG/hydrochlorothiazide 25 mG Capsule 2 Capsule(s) Oral daily    MEDICATIONS  (PRN):      __________________________________________________  REVIEW OF SYSTEMS:    CONSTITUTIONAL: No fever,   EYES: no acute visual disturbances  NECK: No pain or stiffness  RESPIRATORY: No cough; No shortness of breath  CARDIOVASCULAR: No chest pain, no palpitations  GASTROINTESTINAL: No pain. No nausea or vomiting; No diarrhea   NEUROLOGICAL: No headache or tremors; Numbness of bilateral hands, chronic and stable  MUSCULOSKELETAL: No joint pain, no muscle pain; RUQ pain radiating down to groin/inner thigh area  GENITOURINARY: no dysuria, no frequency, no hesitancy      Vital Signs Last 24 Hrs  T(C): 36.6 (18 Mar 2019 14:40), Max: 36.6 (18 Mar 2019 14:40)  T(F): 97.8 (18 Mar 2019 14:40), Max: 97.8 (18 Mar 2019 14:40)  HR: 81 (18 Mar 2019 14:40) (71 - 86)  BP: 137/60 (18 Mar 2019 14:40) (121/47 - 137/60)  BP(mean): --  RR: 16 (18 Mar 2019 14:40) (16 - 16)  SpO2: 98% (18 Mar 2019 14:40) (98% - 100%)    ________________________________________________  PHYSICAL EXAM:    GENERAL: NAD  HEENT: Normocephalic;  conjunctivae and sclerae clear; moist mucous membranes;   NECK : supple  CHEST/LUNG: Clear to auscultation bilaterally with good air entry   HEART: S1 S2  regular; no murmurs, gallops or rubs  ABDOMEN: Soft, Nondistended; Bowel sounds present; tenderness to RLQ  EXTREMITIES: no cyanosis; no edema; no calf tenderness  SKIN: warm and dry; no rash  NERVOUS SYSTEM:  Awake and alert; no new deficits; numbness on bilateral hands, stable    _________________________________________________  LABS:                        13.5   7.62  )-----------( 239      ( 18 Mar 2019 07:15 )             42.8     03-18    136  |  101  |  18  ----------------------------<  105<H>  3.5   |  31  |  0.89    Ca    8.9      18 Mar 2019 07:15          CAPILLARY BLOOD GLUCOSE      POCT Blood Glucose.: 106 mg/dL (18 Mar 2019 11:59)  POCT Blood Glucose.: 105 mg/dL (18 Mar 2019 07:48)  POCT Blood Glucose.: 95 mg/dL (17 Mar 2019 21:44)  POCT Blood Glucose.: 98 mg/dL (17 Mar 2019 16:25)        RADIOLOGY & ADDITIONAL TESTS:    Imaging Personally Reviewed:  YES    Consultant(s) Notes Reviewed:   YES    Care Discussed with Consultants :     Plan of care was discussed with patient and /or primary care giver; all questions and concerns were addressed and care was aligned with patient's wishes.
Patient is a 63y old  Female who presents with a chief complaint of sob, chest pain and right sided abdominal pain (17 Mar 2019 12:02)    INTERVAL HPI/OVERNIGHT EVENTS:  Patient seen and examined at bedside, feels ok  Medicatrion allergies discussed with her, she does not remember exactely what happened.   Amlodipine listed as Allergy as she gets LE edema and states problem breathing, Most likely her SOB is secondary   to obesity and deconditioning instead.     Allergies  amlodipine (Short breath)  chlorthalidone (Anaphylaxis)  clonidine (Anaphylaxis)  diltiazem (Anaphylaxis)  furosemide (Short breath)  Lasix (Pruritus)  lisinopril (Anaphylaxis)  nebivolol (Anaphylaxis)  shellfish (Hives)    Intolerances        REVIEW OF SYSTEMS:  CONSTITUTIONAL: No fever, weight loss, or fatigue  EYES: No eye pain, visual disturbances, or discharge  RESPIRATORY: No cough, wheezing or shortness of breath  CARDIOVASCULAR: No chest pain, feeling of heart beats  GASTROINTESTINAL: No abdominal or epigastric pain. No nausea, vomiting; No diarrhea or constipation.  GENITOURINARY: No dysuria, frequency, hematuria  NEUROLOGICAL: No headaches  MUSCULOSKELETAL: No joint pain  PSYCHIATRIC: No depression or anxiety  HEME/LYMPH: No easy bruising, or bleeding gums  ALLERGY AND IMMUNOLOGIC: No hives or eczema    Medications:  aspirin  chewable 81 milliGRAM(s) Oral daily  atorvastatin 40 milliGRAM(s) Oral at bedtime  docusate sodium 100 milliGRAM(s) Oral daily  enoxaparin Injectable 40 milliGRAM(s) SubCutaneous daily  insulin lispro (HumaLOG) corrective regimen sliding scale   SubCutaneous three times a day before meals  insulin lispro (HumaLOG) corrective regimen sliding scale   SubCutaneous at bedtime  losartan 100 milliGRAM(s) Oral daily  metoprolol tartrate 25 milliGRAM(s) Oral two times a day  senna 1 Tablet(s) Oral daily  triamterene 37.5 mG/hydrochlorothiazide 25 mG Capsule 2 Capsule(s) Oral daily      PHYSICAL EXAM:  Vital Signs Last 24 Hrs  T(C): 36.5 (18 Mar 2019 03:43), Max: 36.5 (17 Mar 2019 14:48)  T(F): 97.7 (18 Mar 2019 03:43), Max: 97.7 (17 Mar 2019 14:48)  HR: 73 (18 Mar 2019 03:43) (71 - 86)  BP: 131/74 (18 Mar 2019 03:43) (121/47 - 133/82)  BP(mean): --  RR: 16 (18 Mar 2019 03:43) (16 - 16)  SpO2: 99% (18 Mar 2019 03:43) (97% - 100%)    GENERAL: morbidly obese.   HEAD:  Atraumatic, Normocephalic  EYES: EOMI, PERRLA, conjunctiva and sclera clear  ENT: moist mucous membranes  NECK: Supple, No JVD, Normal thyroid  NERVOUS SYSTEM:  Alert & Oriented X3, Good concentration; Motor Strength 5/5 B/L upper and lower extremities; DTRs 2+ intact and symmetric  CHEST/LUNG: Poor air entry  HEART: distant heart sounds  ABDOMEN: Soft, Nontender, Nondistended; Bowel sounds present  EXTREMITIES: +1 LE edema  SKIN: bilateral LE darkened skin secondary Most likely to PVD.     LABS:                        13.5   7.62  )-----------( 239      ( 18 Mar 2019 07:15 )             42.8     03-18    136  |  101  |  18  ----------------------------<  105<H>  3.5   |  31  |  0.89    Ca    8.9      18 Mar 2019 07:15                  Culture & Sensitivity:   CAPILLARY BLOOD GLUCOSE      POCT Blood Glucose.: 106 mg/dL (18 Mar 2019 11:59)  POCT Blood Glucose.: 105 mg/dL (18 Mar 2019 07:48)  POCT Blood Glucose.: 95 mg/dL (17 Mar 2019 21:44)  POCT Blood Glucose.: 98 mg/dL (17 Mar 2019 16:25)        RADIOLOGY & ADDITIONAL TESTS:  Radiology testing independently reviewed:     Consultant(s) Notes Reviewed:  [ x] YES  [ ] NO    Care Discussed with Consultants/Other Providers [ x] YES  [ ] NO
Progress not to be completed    PGY 1 Note discussed with supervising resident and primary attending    Patient is a 63y old  Female who presents with a chief complaint of sob, chest pain and right sided abdominal pain (18 Mar 2019 17:46)      INTERVAL HPI/OVERNIGHT EVENTS: No acute events overnight, remains afebrile; HD stable, H/H stable, WBC WNL    MEDICATIONS  (STANDING):  aspirin  chewable 81 milliGRAM(s) Oral daily  atorvastatin 40 milliGRAM(s) Oral at bedtime  cyanocobalamin Injectable 1000 MICROGram(s) IntraMuscular daily  docusate sodium 100 milliGRAM(s) Oral daily  enoxaparin Injectable 40 milliGRAM(s) SubCutaneous daily  hydrocortisone 2.5% Ointment 1 Application(s) Topical two times a day  insulin lispro (HumaLOG) corrective regimen sliding scale   SubCutaneous three times a day before meals  insulin lispro (HumaLOG) corrective regimen sliding scale   SubCutaneous at bedtime  losartan 100 milliGRAM(s) Oral daily  metoprolol tartrate 25 milliGRAM(s) Oral two times a day  senna 1 Tablet(s) Oral daily  triamterene 37.5 mG/hydrochlorothiazide 25 mG Capsule 2 Capsule(s) Oral daily    MEDICATIONS  (PRN):      __________________________________________________  REVIEW OF SYSTEMS:    CONSTITUTIONAL: No fever,   EYES: no acute visual disturbances  NECK: No pain or stiffness  RESPIRATORY: No cough; No shortness of breath  CARDIOVASCULAR: No chest pain, no palpitations  GASTROINTESTINAL: No pain. No nausea or vomiting; No diarrhea   NEUROLOGICAL: No headache or tremors; Numbness of bilateral hands, chronic and stable  MUSCULOSKELETAL: No joint pain, no muscle pain; RUQ pain radiating down to groin/inner thigh area  GENITOURINARY: no dysuria, no frequency, no hesitancy      Vital Signs Last 24 Hrs  T(C): 36.7 (18 Mar 2019 21:17), Max: 36.8 (18 Mar 2019 18:17)  T(F): 98 (18 Mar 2019 21:17), Max: 98.3 (18 Mar 2019 18:17)  HR: 73 (18 Mar 2019 21:17) (73 - 88)  BP: 136/65 (18 Mar 2019 21:17) (136/65 - 150/79)  BP(mean): --  RR: 17 (18 Mar 2019 21:17) (16 - 18)  SpO2: 96% (18 Mar 2019 21:17) (96% - 98%)    ________________________________________________  PHYSICAL EXAM:    GENERAL: NAD  HEENT: Normocephalic;  conjunctivae and sclerae clear; moist mucous membranes;   NECK : supple  CHEST/LUNG: Clear to auscultation bilaterally with good air entry   HEART: S1 S2  regular; no murmurs, gallops or rubs  ABDOMEN: Soft, Nondistended; Bowel sounds present; tenderness to RLQ  EXTREMITIES: no cyanosis; no edema; no calf tenderness  SKIN: warm and dry; no rash  NERVOUS SYSTEM:  Awake and alert; no new deficits; numbness on bilateral hands, stable    _________________________________________________  LABS:                        13.2   7.33  )-----------( 233      ( 19 Mar 2019 06:23 )             42.7     03-19    137  |  102  |  18  ----------------------------<  110<H>  3.7   |  32<H>  |  0.99    Ca    9.0      19 Mar 2019 06:23          CAPILLARY BLOOD GLUCOSE      POCT Blood Glucose.: 109 mg/dL (19 Mar 2019 08:25)  POCT Blood Glucose.: 136 mg/dL (18 Mar 2019 22:31)  POCT Blood Glucose.: 107 mg/dL (18 Mar 2019 17:12)  POCT Blood Glucose.: 106 mg/dL (18 Mar 2019 11:59)        RADIOLOGY & ADDITIONAL TESTS:    Imaging Personally Reviewed:  YES    Consultant(s) Notes Reviewed:   YES    Care Discussed with Consultants :     Plan of care was discussed with patient and /or primary care giver; all questions and concerns were addressed and care was aligned with patient's wishes.

## 2019-03-19 NOTE — DISCHARGE NOTE NURSING/CASE MANAGEMENT/SOCIAL WORK - NSDCDPATPORTLINK_GEN_ALL_CORE
You can access the [facility] Patient Portal, offered by [facility], by registering with the following website: [email or web address]

## 2019-03-19 NOTE — DISCHARGE NOTE PROVIDER - HOSPITAL COURSE
Mild left hydronephrosis.        Unlikely any infarct, patient denies any focal weakness, only complains of generalized numbenss which is mos likely secondary to above Chiari 1.        · Assessment	    Patient is a 63 year old female from home, with PMHx of  morbidly obese, Pre- diabetic, HTN, CHICA ( on Cpap but non compliant) presented to ED from PCP office with High BP. As per patient for last 3-4 days her SBP has been in 190's she went to see her PCP, advised her to go to hospital. Patient complains of chest discomfort, SOB, exertional dyspnea, b/l lower ext swelling and orthopnea. Patient also reports of right side abdominal pain radiating to right groin region associated with dysuria. Patient denies any h/o CVD or renal stones. Patient denies fever, chills, headaches, cough, nausea, vomiting, diarrhea, constipation, skin rashes, muscle or joint pains. Patient has been on multiple hypertensive medications, complains of allergies to BB, diuretics, CCB, lisinopril, currently pt was on losartan, nifedipine and HCTZ/ Triamterene. pt reports she has been non- compliant with medication due to side effects. Patient has CHICA on Cpap but she was not using at home.           Chest pain [R07.9]    Patient presented with chest pain, sob, exertional dyspnea. On admission pt was afebrile, /68, Hr 84, RR 18, no leucocytosis, normal electrolytes and renal functions. Admitted to ACS r/o. CXR no congestion or consolidation. EKG NSR with no st t wave changes. SUPRIYA score 2 pt has 8% mortality risk. Trop 0.054->0.060->0.057. BNP, TSH, A1c, lipid panel WNL. CT A/P - No bowel obstruction or grossly thickened bowel wall. Colonic diverticulosis without evidence for diverticulitis. TTE WNL.    - Cardiology's consult Dr Miranda    - Continue with aspirin, statin and BB    - Pt c/o persistent and significant intermittent RLQ abdominal pain radiating down to R inguinal and inner thigh areas. Although CT A/P with oral and Iv contrast did not show renal stones    -Renal US showed mild left hydronephrosis although showed no evidence of calculus    - Stress likely as an outpt given patient's weight    - B/l LE varicose veins with TTP noted    - F/u Doppler b/l LE    - F/u PVR with ABIs        DM (diabetes mellitus) [E11.9]    As per patient she is pre- diabetic     last A1c 6.4%    - sliding scale    - diabetic diet     - hba1c 6.3        Hypertension [I10]    Likely from CHICA and poor compliance to the medications. Currently BP better controlled, goal should be <130/801 on diabetes patients. Dizziness and headache in patient with HTN urgency/emergency concerning for possible stroke. CT head - Crowding of the posterior fossa and particularly at the level of foramen magnum, possible posterior fossa lesion or tonsillar ectopia. No acute hemorrhage or CT evidence of an acute territorial infarction.     -Pt needs to lose her weight and do regular exercise. CHICA should be also better controlled.    -Continue with metoprolol losartan, triamterene -HCTZ    -MRI brain to evaluate for stroke, if there is stroke can complete stroke w/p    -MRI performed today 3/18, confirmed with MRI, will f/u with readings    -The patient can follow up in the Neurology clinic ([place] or [place]) for long-term management of Chiari I malformation and b/l hand numbness, which may be related or may be due to superimposed b/l carpal tunnel syndrome    -Outpatient EMG/NCS to help distinguish between a cervical spinal or peripheral nerve entrapment etiology        CHICA on CPAP [G47.33]    bakari Cpap        Prophylactic measure [Z29.9]    Lovenox for Dvt prophylaxis         CHICA (obstructive sleep apnea) [327.23]    Advised to use machine but pt reports not being able to afford one Patient (pt) presented with chest pain, sob, exertional dyspnea. On admission pt was afebrile, /68, Hr 84, RR 18, no leucocytosis, normal electrolytes and renal functions. Admitted to acute coronary artery ule out. Chest X-ray showed no congestion or consolidation. EKG showed normal sinus rhythm with no st or t wave changes. SUPRIYA score 2 pt has 8% mortality risk. Cardiac enzymes were within normal range. BNP, TSH, Hemoglobin A1c, lipid panel all normal. CT abdomen/pelvis showed no bowel obstruction or grossly thickened bowel wall. Colonic diverticulosis without evidence for diverticulitis. Echocardiogram was normal.    Cardiologist Dr Miranda followed the patient.    Patient was continued with aspirin, statin and BB.    Pt complained of persistent and significant intermittent right lower quadrant abdominal pain radiating down to right inguinal and inner thigh areas. Although CT abdomen/pelvis with oral and IV contrast did not show renal stones.    Renal ultrasound showed mild left hydronephrosis although showed no evidence of calculus..    Stress is to be done as outpatient given patient's weight not supported by the stress test machine in our hospital.    Bilateral lower extremity varicose veins with tenderness to palpation noted.    Venous doppler of bilateral legs did not show any evidence of DVT or clots.    Arterial study of the bilateral legs with PVR with ABIs attempted but because of the girth of the patent’s calves, it could not be performed as an inpatient. Patient advised to pursue the study as an outpatient to document the good circulation of the bilateral legs.        Pt presented with history of hypertension likely from CHICA and poor compliance to the medications. Currently BP better controlled, goal should be <130/801 on diabetes patients. Patient is to continue with metoprolol, losartan, and triamterene -HCTZ after discharge. Dizziness and headache in patient with HTN urgency/emergency concerning for possible stroke. CT head showed nonspecific crowding of the posterior fossa and particularly at the level of foramen magnum, possible posterior fossa lesion or tonsillar ectopia. No acute hemorrhage or CT evidence of an acute territorial infarction.     MRI performed 3/18 to evaluate for stoke but only showed changes consistent with Chiari I malformation, trace white matter changes, chromic microvascular ischemic process likely secondary to hypertension, obesity, and age. However, no acute intracranial processes or changes consistent with stroke was found.        -Pt needs to lose her weight and do regular exercise. CHICA should be also better controlled.        -The patient can follow up in the Neurology clinic ([place] or [place]) for long-term management of Chiari I malformation and b/l hand numbness, which may be related or may be due to superimposed b/l carpal tunnel syndrome    -Outpatient EMG/NCS to help distinguish between a cervical spinal or peripheral nerve entrapment etiology        Pt presented with history of CHICA. Please continue to use CPAP at nighttime during sleep. Please follow up with your PCP within 1 week of discharge. Patient (pt) presented with chest pain, sob, exertional dyspnea. On admission pt was afebrile, /68, Hr 84, RR 18, no leucocytosis, normal electrolytes and renal functions. Admitted to acute coronary artery ule out. Chest X-ray showed no congestion or consolidation. EKG showed normal sinus rhythm with no st or t wave changes. SUPRIYA score 2 pt has 8% mortality risk. Cardiac enzymes were within normal range. BNP, TSH, Hemoglobin A1c, lipid panel all normal. CT abdomen/pelvis showed no bowel obstruction or grossly thickened bowel wall. Colonic diverticulosis without evidence for diverticulitis. Echocardiogram was normal.    Cardiologist Dr Miranda followed the patient.    Patient was continued with aspirin, statin and BB.    Pt complained of persistent and significant intermittent right lower quadrant abdominal pain radiating down to right inguinal and inner thigh areas. Although CT abdomen/pelvis with oral and IV contrast did not show renal stones.    Renal ultrasound showed mild left hydronephrosis although showed no evidence of calculus.    Stress is to be done as outpatient given patient's weight not supported by the stress test machine in our hospital.    Bilateral lower extremity varicose veins with tenderness to palpation noted.    Venous doppler of bilateral legs did not show any evidence of DVT or clots.    Arterial study of the bilateral legs with PVR with ABIs attempted but because of the girth of the patent’s calves, it could not be performed as an inpatient. Patient advised to pursue the study as an outpatient to document the good circulation of the bilateral legs.        Pt presented with history of hypertension likely from CHICA and poor compliance to the medications. Currently BP better controlled, goal should be <130/801 on diabetes patients. Patient is to continue with metoprolol, losartan, and triamterene -HCTZ after discharge. Dizziness and headache in patient with HTN urgency/emergency concerning for possible stroke. CT head showed nonspecific crowding of the posterior fossa and particularly at the level of foramen magnum, possible posterior fossa lesion or tonsillar ectopia. No acute hemorrhage or CT evidence of an acute territorial infarction.     MRI performed 3/18 to evaluate for stoke only showed changes consistent with Chiari I malformation, trace white matter changes, chronic microvascular ischemic process likely secondary to hypertension, obesity, and age. However, no acute intracranial processes or changes consistent with stroke was found.        Patient will follow up in the Neurology clinic listed below for long-term management of Chiari I malformation and bilateral hand numbness, which may be related or may be due to superimposed bilateral carpal tunnel syndrome.    Outpatient EMG/NCS neurological studies will also be performed to help distinguish between a cervical spinal or peripheral nerve entrapment etiology.    Faby Garcia MD    Phone: [phone]     [facility]    [address] [place] [address], [facility] [place] [address]         Pt also needs to lose her weight and do regular exercise. Please follow up with Dr. Saúl Aviles MD as an outpatient at for evaluation for need for bariatric surgery. His contact information is:    Phone: [phone]     [address] [place] [address]         Pt presented with history of CHICA. Please continue to use CPAP at nighttime during sleep. Please follow up with your PCP within 1 week of discharge.

## 2019-05-16 ENCOUNTER — RESULT REVIEW (OUTPATIENT)
Age: 64
End: 2019-05-16

## 2019-09-29 NOTE — PHYSICAL THERAPY INITIAL EVALUATION ADULT - GENERAL OBSERVATIONS, REHAB EVAL
Continue Coreg, Cardura  Blood pressures have been labile  monitor blood pressures  Patient received sitting in chair, alert and awake, NAD.

## 2019-10-12 NOTE — ED PROVIDER NOTE - DATE/TIME 1
Report received from KIARA Martin. Assessment done, Vitals stable, patient oriented to room & skylight. Call light placed within reach, patient educated in regards to safety of baby and herself. Patient instructed to call before ambulating. All questions answered.    12-Apr-2017 15:25

## 2020-11-05 ENCOUNTER — LABORATORY RESULT (OUTPATIENT)
Age: 65
End: 2020-11-05

## 2020-11-05 ENCOUNTER — APPOINTMENT (OUTPATIENT)
Dept: NEUROLOGY | Facility: CLINIC | Age: 65
End: 2020-11-05
Payer: COMMERCIAL

## 2020-11-05 VITALS
DIASTOLIC BLOOD PRESSURE: 87 MMHG | HEIGHT: 66 IN | TEMPERATURE: 97.4 F | BODY MASS INDEX: 47.09 KG/M2 | SYSTOLIC BLOOD PRESSURE: 184 MMHG | OXYGEN SATURATION: 96 % | WEIGHT: 293 LBS | HEART RATE: 84 BPM

## 2020-11-05 DIAGNOSIS — Z78.9 OTHER SPECIFIED HEALTH STATUS: ICD-10-CM

## 2020-11-05 DIAGNOSIS — Z87.42 PERSONAL HISTORY OF OTHER DISEASES OF THE FEMALE GENITAL TRACT: ICD-10-CM

## 2020-11-05 DIAGNOSIS — Z87.440 PERSONAL HISTORY OF URINARY (TRACT) INFECTIONS: ICD-10-CM

## 2020-11-05 DIAGNOSIS — S90.852A SUPERFICIAL FOREIGN BODY, LEFT FOOT, INITIAL ENCOUNTER: ICD-10-CM

## 2020-11-05 DIAGNOSIS — R73.03 PREDIABETES.: ICD-10-CM

## 2020-11-05 DIAGNOSIS — H93.19 TINNITUS, UNSPECIFIED EAR: ICD-10-CM

## 2020-11-05 DIAGNOSIS — J42 UNSPECIFIED CHRONIC BRONCHITIS: ICD-10-CM

## 2020-11-05 DIAGNOSIS — L40.9 PSORIASIS, UNSPECIFIED: ICD-10-CM

## 2020-11-05 DIAGNOSIS — J44.9 CHRONIC OBSTRUCTIVE PULMONARY DISEASE, UNSPECIFIED: ICD-10-CM

## 2020-11-05 DIAGNOSIS — F41.9 ANXIETY DISORDER, UNSPECIFIED: ICD-10-CM

## 2020-11-05 DIAGNOSIS — E66.01 MORBID (SEVERE) OBESITY DUE TO EXCESS CALORIES: ICD-10-CM

## 2020-11-05 DIAGNOSIS — Z80.1 FAMILY HISTORY OF MALIGNANT NEOPLASM OF TRACHEA, BRONCHUS AND LUNG: ICD-10-CM

## 2020-11-05 DIAGNOSIS — R20.2 ANESTHESIA OF SKIN: ICD-10-CM

## 2020-11-05 DIAGNOSIS — Z82.49 FAMILY HISTORY OF ISCHEMIC HEART DISEASE AND OTHER DISEASES OF THE CIRCULATORY SYSTEM: ICD-10-CM

## 2020-11-05 DIAGNOSIS — M19.90 UNSPECIFIED OSTEOARTHRITIS, UNSPECIFIED SITE: ICD-10-CM

## 2020-11-05 DIAGNOSIS — L85.3 XEROSIS CUTIS: ICD-10-CM

## 2020-11-05 DIAGNOSIS — R20.0 ANESTHESIA OF SKIN: ICD-10-CM

## 2020-11-05 DIAGNOSIS — L84 CORNS AND CALLOSITIES: ICD-10-CM

## 2020-11-05 DIAGNOSIS — H61.20 IMPACTED CERUMEN, UNSPECIFIED EAR: ICD-10-CM

## 2020-11-05 DIAGNOSIS — I10 ESSENTIAL (PRIMARY) HYPERTENSION: ICD-10-CM

## 2020-11-05 PROCEDURE — 99072 ADDL SUPL MATRL&STAF TM PHE: CPT

## 2020-11-05 PROCEDURE — 99214 OFFICE O/P EST MOD 30 MIN: CPT

## 2020-11-05 RX ORDER — METFORMIN HYDROCHLORIDE 500 MG/1
500 TABLET, COATED ORAL
Refills: 0 | Status: ACTIVE | COMMUNITY

## 2020-11-05 RX ORDER — HYDRALAZINE HYDROCHLORIDE 100 MG/1
100 TABLET ORAL 3 TIMES DAILY
Refills: 0 | Status: ACTIVE | COMMUNITY

## 2020-11-05 RX ORDER — MOMETASONE FUROATE 1 MG/G
0.1 CREAM TOPICAL
Refills: 0 | Status: ACTIVE | COMMUNITY

## 2020-11-05 RX ORDER — HYDROCORTISONE 25 MG/ML
2.5 LOTION TOPICAL
Refills: 0 | Status: ACTIVE | COMMUNITY

## 2020-11-05 RX ORDER — LOSARTAN POTASSIUM 100 MG/1
100 TABLET, FILM COATED ORAL
Refills: 0 | Status: ACTIVE | COMMUNITY

## 2020-11-05 RX ORDER — TRIAMTERENE AND HYDROCHLOROTHIAZIDE 37.5; 25 MG/1; MG/1
37.5-25 CAPSULE ORAL
Refills: 0 | Status: ACTIVE | COMMUNITY

## 2020-11-05 RX ORDER — DULOXETINE HYDROCHLORIDE 30 MG/1
30 CAPSULE, DELAYED RELEASE PELLETS ORAL
Qty: 30 | Refills: 5 | Status: ACTIVE | COMMUNITY
Start: 2020-11-05 | End: 1900-01-01

## 2020-11-05 RX ORDER — BUDESONIDE AND FORMOTEROL FUMARATE DIHYDRATE 80; 4.5 UG/1; UG/1
80-4.5 AEROSOL RESPIRATORY (INHALATION)
Refills: 0 | Status: ACTIVE | COMMUNITY

## 2020-11-05 RX ORDER — CHOLECALCIFEROL (VITAMIN D3) 1250 MCG
1.25 MG CAPSULE ORAL WEEKLY
Refills: 0 | Status: ACTIVE | COMMUNITY

## 2020-11-05 NOTE — HISTORY OF PRESENT ILLNESS
[FreeTextEntry1] : The patient is obese and has h/o of sleep apnea and is here for numbness and tingling in her whole body that comes and goes.  She feels weakness in  the arms and the legs.  SHe was previously diagnosed with CTS bl.  No weight loss or chills.  She was also diagnosed with pre-DM.\par \par The patient also has occasional headaches.  She was previously diagnosed with Chiari 1 malformation.

## 2020-11-05 NOTE — DATA REVIEWED
[de-identified] : \par EXAM: MR BRAIN \par \par \par PROCEDURE DATE: 03/18/2019 \par \par \par \par INTERPRETATION: MRI brain without contrast \par \par Comparison CT performed 4 days prior \par \par History headache and dizziness \par \par There is metallic induced artifact related to material in the left posterior \par scalp. There is no mass effect, cortical edema or hydrocephalus. There is \par trace scattered increased T2 signal in the subcortical white matter in a \par frontal distribution. Cortical volume is maintained. The cerebellar tonsils \par are tapered and extend 7 mm below the foramen magnum with mild brainstem \par crowding. The diffusion-weighted sequence is severely impacted by the \par artifact with the left posterior temporal and parietal regions completely \par obscured with partial limitation of the medial right occipital lobe and left \par posterior fossa. There is no evidence of diffusion restriction to suggest \par acute infarct in the unaffected areas. The gradient echo sequence is \par similarly compromised without evidence of previous parenchymal hemorrhage in \par the remainder of the brain. There is an empty sella without expansion. The \par orbital contents are grossly normal. \par \par IMPRESSION: \par Limitations secondary to artifact as above \par Chiari I malformation. Trace white matter change likely representing \par migraines or chronic microvascular ischemic process. Empty sella. No acute \par findings. \par \par \par \par \par \par \par \par DENISSE JIANG M.D., ATTENDING RADIOLOGIST \par This document has been electronically signed. Mar 18 2019 4:03PM  [de-identified] : pulm note appreciated\par path results noted

## 2020-11-05 NOTE — CONSULT LETTER
[Dear  ___] : Dear  [unfilled], [Consult Letter:] : I had the pleasure of evaluating your patient, [unfilled]. [Please see my note below.] : Please see my note below. [Consult Closing:] : Thank you very much for allowing me to participate in the care of this patient.  If you have any questions, please do not hesitate to contact me. [Sincerely,] : Sincerely, [FreeTextEntry3] : Faby Garcia MD, MPH\par

## 2020-11-05 NOTE — REVIEW OF SYSTEMS
[Fever] : no fever [As Noted in HPI] : as noted in HPI [Anxiety] : no anxiety [Eyesight Problems] : no eyesight problems [Nosebleeds] : no nosebleeds [Chest Pain] : no chest pain [Cough] : no cough [Vomiting] : no vomiting [Incontinence] : no incontinence [Joint Pain] : joint pain [Itching] : no itching [Muscle Weakness] : muscle weakness [Easy Bleeding] : no tendency for easy bleeding

## 2020-11-05 NOTE — ASSESSMENT
[FreeTextEntry1] : Numbness and tingling in patient with pre-DM who also has bl CTS.  WIll get ncs.emg of the arms and the legs.  WIll start Cymbalta 30mg for the pain\par \par Chiari 1 malformation, will get MRI brain to assess for stability

## 2020-11-11 LAB
ACE BLD-CCNC: 35 U/L
ALBUMIN MFR SERPL ELPH: 51.6 %
ALBUMIN SERPL ELPH-MCNC: 4.3 G/DL
ALBUMIN SERPL-MCNC: 3.9 G/DL
ALBUMIN/GLOB SERPL: 1.1 RATIO
ALP BLD-CCNC: 94 U/L
ALPHA1 GLOB MFR SERPL ELPH: 4.1 %
ALPHA1 GLOB SERPL ELPH-MCNC: 0.3 G/DL
ALPHA2 GLOB MFR SERPL ELPH: 9 %
ALPHA2 GLOB SERPL ELPH-MCNC: 0.7 G/DL
ALT SERPL-CCNC: 32 U/L
ANA SER IF-ACNC: NEGATIVE
ANION GAP SERPL CALC-SCNC: 15 MMOL/L
AST SERPL-CCNC: 20 U/L
B BURGDOR AB SER-IMP: NEGATIVE
B BURGDOR IGM PATRN SER IB-IMP: NEGATIVE
B BURGDOR18KD IGG SER QL IB: NORMAL
B BURGDOR23KD IGG SER QL IB: NORMAL
B BURGDOR23KD IGM SER QL IB: NORMAL
B BURGDOR28KD IGG SER QL IB: NORMAL
B BURGDOR30KD IGG SER QL IB: NORMAL
B BURGDOR31KD IGG SER QL IB: NORMAL
B BURGDOR39KD IGG SER QL IB: NORMAL
B BURGDOR39KD IGM SER QL IB: NORMAL
B BURGDOR41KD IGG SER QL IB: PRESENT
B BURGDOR41KD IGM SER QL IB: NORMAL
B BURGDOR45KD IGG SER QL IB: NORMAL
B BURGDOR58KD IGG SER QL IB: NORMAL
B BURGDOR66KD IGG SER QL IB: NORMAL
B BURGDOR93KD IGG SER QL IB: NORMAL
B-GLOBULIN MFR SERPL ELPH: 13.3 %
B-GLOBULIN SERPL ELPH-MCNC: 1 G/DL
BASOPHILS # BLD AUTO: 0.05 K/UL
BASOPHILS NFR BLD AUTO: 0.7 %
BILIRUB SERPL-MCNC: 0.2 MG/DL
BUN SERPL-MCNC: 18 MG/DL
CALCIUM SERPL-MCNC: 10 MG/DL
CARDIOLIPIN AB SER IA-ACNC: NEGATIVE
CHLORIDE SERPL-SCNC: 103 MMOL/L
CO2 SERPL-SCNC: 24 MMOL/L
CREAT SERPL-MCNC: 0.94 MG/DL
CRP SERPL-MCNC: 0.83 MG/DL
EBV DNA SERPL NAA+PROBE-ACNC: NOT DETECTED IU/ML
ENA SCL70 IGG SER IA-ACNC: <0.2 AL
ENA SS-A AB SER IA-ACNC: <0.2 AL
ENA SS-B AB SER IA-ACNC: <0.2 AL
EOSINOPHIL # BLD AUTO: 0.17 K/UL
EOSINOPHIL NFR BLD AUTO: 2.4 %
ERYTHROCYTE [SEDIMENTATION RATE] IN BLOOD BY WESTERGREN METHOD: 50 MM/HR
ESTIMATED AVERAGE GLUCOSE: 148 MG/DL
FOLATE SERPL-MCNC: 7.7 NG/ML
GAMMA GLOB FLD ELPH-MCNC: 1.6 G/DL
GAMMA GLOB MFR SERPL ELPH: 22 %
GLUCOSE BS SERPL-MCNC: 111 MG/DL
GLUCOSE SERPL-MCNC: 115 MG/DL
HBA1C MFR BLD HPLC: 6.8 %
HCT VFR BLD CALC: 44.3 %
HGB BLD-MCNC: 13.7 G/DL
HIV1+2 AB SPEC QL IA.RAPID: NONREACTIVE
HTLV I+II AB SER QL: NORMAL
IMM GRANULOCYTES NFR BLD AUTO: 0.4 %
INTERPRETATION SERPL IEP-IMP: NORMAL
LUPUS ANTICOAGULANT CASCADE REFLEX: NORMAL
LYMPHOCYTES # BLD AUTO: 2.59 K/UL
LYMPHOCYTES NFR BLD AUTO: 35.9 %
M PROTEIN SPEC IFE-MCNC: NORMAL
MAN DIFF?: NORMAL
MCHC RBC-ENTMCNC: 26.6 PG
MCHC RBC-ENTMCNC: 30.9 GM/DL
MCV RBC AUTO: 85.9 FL
MONOCYTES # BLD AUTO: 0.7 K/UL
MONOCYTES NFR BLD AUTO: 9.7 %
NEUTROPHILS # BLD AUTO: 3.67 K/UL
NEUTROPHILS NFR BLD AUTO: 50.9 %
PLATELET # BLD AUTO: 170 K/UL
POTASSIUM SERPL-SCNC: 3.6 MMOL/L
PROT SERPL-MCNC: 7.5 G/DL
RBC # BLD: 5.16 M/UL
RBC # FLD: 16.5 %
SODIUM SERPL-SCNC: 143 MMOL/L
T PALLIDUM AB SER QL IA: NEGATIVE
T3 SERPL-MCNC: 124 NG/DL
T4 SERPL-MCNC: 5.3 UG/DL
TSH SERPL-ACNC: 1.39 UIU/ML
TTG IGA SER IA-ACNC: <1.2 U/ML
TTG IGA SER-ACNC: NEGATIVE
TTG IGG SER IA-ACNC: 4.3 U/ML
TTG IGG SER IA-ACNC: NEGATIVE
VIT B1 SERPL-MCNC: 111.6 NMOL/L
VIT B12 SERPL-MCNC: 369 PG/ML
WBC # FLD AUTO: 7.21 K/UL

## 2020-11-12 LAB — CMV IGG AVIDITY SERPL IA-RTO: 0.93

## 2020-11-13 LAB — METHYLMALONATE SERPL-SCNC: 234 NMOL/L

## 2020-11-16 LAB
NICOTINAMIDE: 76 NG/ML
NICOTINIC ACID: <5 NG/ML

## 2020-11-20 LAB — VIT B6 SERPL-MCNC: 6.4 UG/L

## 2020-12-03 ENCOUNTER — APPOINTMENT (OUTPATIENT)
Dept: MRI IMAGING | Facility: CLINIC | Age: 65
End: 2020-12-03
Payer: COMMERCIAL

## 2020-12-03 ENCOUNTER — OUTPATIENT (OUTPATIENT)
Dept: OUTPATIENT SERVICES | Facility: HOSPITAL | Age: 65
LOS: 1 days | End: 2020-12-03
Payer: COMMERCIAL

## 2020-12-03 DIAGNOSIS — Z00.8 ENCOUNTER FOR OTHER GENERAL EXAMINATION: ICD-10-CM

## 2020-12-03 PROCEDURE — 70551 MRI BRAIN STEM W/O DYE: CPT | Mod: 26

## 2020-12-03 PROCEDURE — 70551 MRI BRAIN STEM W/O DYE: CPT

## 2020-12-04 DIAGNOSIS — R90.89 OTHER ABNORMAL FINDINGS ON DIAGNOSTIC IMAGING OF CENTRAL NERVOUS SYSTEM: ICD-10-CM

## 2020-12-17 ENCOUNTER — APPOINTMENT (OUTPATIENT)
Dept: NEUROLOGY | Facility: CLINIC | Age: 65
End: 2020-12-17
Payer: COMMERCIAL

## 2020-12-17 PROCEDURE — 99213 OFFICE O/P EST LOW 20 MIN: CPT | Mod: 95

## 2020-12-17 NOTE — HISTORY OF PRESENT ILLNESS
[Verbal consent obtained from patient] : the patient, [unfilled] [Home] : at home, [unfilled] , at the time of the visit. [Other Location: e.g. Home (Enter Location, City,State)___] : at [unfilled] [FreeTextEntry4] : [place] [facility] [FreeTextEntry1] : The patient is obese and has h/o of sleep apnea and is here for numbness and tingling in her whole body that comes and goes. She feels weakness in the arms and the legs. SHe was previously diagnosed with CTS bl. No weight loss or chills. She was also diagnosed with pre-DM.\par \par The patient also has occasional headaches. She was previously diagnosed with Chiari 1 malformation. \par \par The patient has not started Cymbalta yet.  She is pending hem/onc eval for enlarged lymph nodes.  Denies Vit B suppl.  \par

## 2020-12-17 NOTE — ASSESSMENT
[FreeTextEntry1] : Numbness and tingling in patient with pre-DM who also has bl CTS. WIll get ncs.emg of the arms and the legs. WIll start Cymbalta 30mg for the pain.  Labs are notabel for neuropathy labs notable for crp 0.83, esr 50\par glu 111, HbA1C 6.8 and Vit B3 high.  The patient also has possible enlarged parotids, will fu with heme/ onc.  Will monitor vit B3, and will consider nutrition eval (no suppl)\par \par Chiari 1 malformation\par

## 2020-12-17 NOTE — PHYSICAL EXAM
[General Appearance - Alert] : alert [General Appearance - In No Acute Distress] : in no acute distress [Person] : oriented to person [Place] : oriented to place [Time] : oriented to time [Concentration Intact] : normal concentrating ability [Naming Objects] : no difficulty naming common objects [Repeating Phrases] : no difficulty repeating a phrase [Fluency] : fluency intact [Comprehension] : comprehension intact [Vocabulary] : adequate range of vocabulary [Cranial Nerves Facial (VII)] : face symmetrical

## 2021-02-12 ENCOUNTER — APPOINTMENT (OUTPATIENT)
Dept: NEUROLOGY | Facility: CLINIC | Age: 66
End: 2021-02-12
Payer: COMMERCIAL

## 2021-02-12 VITALS
OXYGEN SATURATION: 98 % | BODY MASS INDEX: 47.09 KG/M2 | HEIGHT: 66 IN | WEIGHT: 293 LBS | HEART RATE: 87 BPM | SYSTOLIC BLOOD PRESSURE: 188 MMHG | TEMPERATURE: 97 F | DIASTOLIC BLOOD PRESSURE: 94 MMHG

## 2021-02-12 VITALS — DIASTOLIC BLOOD PRESSURE: 80 MMHG | SYSTOLIC BLOOD PRESSURE: 160 MMHG

## 2021-02-12 PROCEDURE — 99072 ADDL SUPL MATRL&STAF TM PHE: CPT

## 2021-02-12 PROCEDURE — 95911 NRV CNDJ TEST 9-10 STUDIES: CPT

## 2021-02-12 PROCEDURE — 95886 MUSC TEST DONE W/N TEST COMP: CPT

## 2021-04-06 NOTE — H&P PST ADULT - PAIN SCALE PREFERRED, PROFILE
Mr Mcgill is a 71yo Man with medical history of HFrEF, HTN, DM2, and CAD here with acute hypoxic resp failure, due to covid, HFpEF. Was diagnosed with Acute hypoxemic respiratory failure secondary to Covid 19, vs HFrEF, was [id number] on BIPAP could not come off. Today patient developed thrombocytopenia and elevated D-dimer, HIT was suspected as patient was on prophylactic heparin. Argatroban was ordered for patient. But before the medication could be started, pt became hypoxic, tachycardic into 200s, and hypotensive. [facility] forom indicated pt as DNR/DNI and comfort measures only. Subsequently patient went into cardiopulmonary arrest and was pronounced dead at 7:13 pm.          Mr Mcgill is a 71yo Man with medical history of HFrEF, HTN, DM2, and CAD here with acute hypoxic resp failure, due to covid, HFpEF. Was diagnosed with Acute hypoxemic respiratory failure secondary to Covid 19, vs HFrEF, was [id number] on BIPAP could not come off. Today patient developed thrombocytopenia and elevated D-dimer, HIT was suspected as patient was on prophylactic heparin. Argatroban was ordered for patient. But before the medication could be started, pt became hypoxic, tachycardic into 200s, and hypotensive. [facility] for indicated pt as DNR/DNI. Subsequently patient went into cardiopulmonary arrest and was pronounced dead at 7:13 pm.          Mr Mcgill is a 73yo Man with medical history of HFrEF, HTN, DM2, and CAD here with acute hypoxic resp failure, due to covid, HFpEF. Was diagnosed with Acute hypoxemic respiratory failure secondary to Covid 19, vs HFrEF, was [id number] on BIPAP could not come off. Today patient developed thrombocytopenia and elevated D-dimer, HIT was suspected as patient was on prophylactic heparin. Argatroban was ordered for patient. But before the medication could be started, pt became hypoxic, tachycardic into 200s, and hypotensive. [facility] for indicated pt as DNR/DNI. Subsequently patient went into cardiopulmonary arrest and was pronounced dead at 7:13 pm.   Cause of death likely complications of COVID-19 infection.       numerical 0-10

## 2021-06-23 NOTE — ED PROVIDER NOTE - NS ED MD SCRIBE ATTENDING SCRIBE SECTIONS
No pertinent family history in first degree relatives
HIV/HISTORY OF PRESENT ILLNESS/REVIEW OF SYSTEMS/DISPOSITION/PAST MEDICAL/SURGICAL/SOCIAL HISTORY/VITAL SIGNS( Pullset)/PHYSICAL EXAM

## 2021-07-02 ENCOUNTER — APPOINTMENT (OUTPATIENT)
Dept: NEUROLOGY | Facility: CLINIC | Age: 66
End: 2021-07-02
Payer: COMMERCIAL

## 2021-07-02 VITALS
WEIGHT: 293 LBS | HEIGHT: 66 IN | OXYGEN SATURATION: 98 % | SYSTOLIC BLOOD PRESSURE: 177 MMHG | HEART RATE: 97 BPM | BODY MASS INDEX: 47.09 KG/M2 | TEMPERATURE: 96.6 F | DIASTOLIC BLOOD PRESSURE: 88 MMHG

## 2021-07-02 PROCEDURE — 95912 NRV CNDJ TEST 11-12 STUDIES: CPT

## 2021-07-02 NOTE — PROCEDURE
[FreeTextEntry3] : Nerve conduction EMG of the shows moderate right and severe left median nerve entrapment neuropathy with demyelinating features bilaterally.

## 2021-07-09 ENCOUNTER — APPOINTMENT (OUTPATIENT)
Dept: NEUROLOGY | Facility: CLINIC | Age: 66
End: 2021-07-09
Payer: MEDICARE

## 2021-07-09 VITALS
TEMPERATURE: 97.2 F | HEART RATE: 92 BPM | OXYGEN SATURATION: 95 % | DIASTOLIC BLOOD PRESSURE: 88 MMHG | HEIGHT: 66 IN | BODY MASS INDEX: 47.09 KG/M2 | SYSTOLIC BLOOD PRESSURE: 183 MMHG | WEIGHT: 293 LBS

## 2021-07-09 DIAGNOSIS — G56.03 CARPAL TUNNEL SYNDROM,BILATERAL UPPER LIMBS: ICD-10-CM

## 2021-07-09 PROCEDURE — 99214 OFFICE O/P EST MOD 30 MIN: CPT

## 2021-07-09 NOTE — PHYSICAL EXAM
[General Appearance - Alert] : alert [General Appearance - In No Acute Distress] : in no acute distress [Person] : oriented to person [Place] : oriented to place [Time] : oriented to time [Concentration Intact] : normal concentrating ability [Naming Objects] : no difficulty naming common objects [Fluency] : fluency intact [Comprehension] : comprehension intact [Vocabulary] : adequate range of vocabulary [Motor Tone] : muscle tone was normal in all four extremities [No Muscle Atrophy] : normal bulk in all four extremities [FreeTextEntry6] : Strength full expected left APB 4 out of 5 [FreeTextEntry7] : Sensory loss to light touch in the left median nerve distribution [FreeTextEntry8] : Antalgic gait

## 2021-07-09 NOTE — ASSESSMENT
[FreeTextEntry1] : Numbness and tingling in patient with pre-DM who also has bl CTS. nerve conduction EMG of the legs did not show a large fiber neuropathy, will plan to get skin biopsy to evaluate for small fiber neuropathy.  Neuropathy labs notable for neuropathy labs notable for crp 0.83, esr 50, glu 111, HbA1C 6.8 and Vit B3 high. The patient also has possible enlarged parotids, will fu with heme/ onc. Will monitor vit B3, and will consider nutrition eval (no suppl).\par \par nerve conduction EMG of the arm shows moderate right and severe left median nerve entrapment neuropathy with demyelinating features bilaterally.  We will refer the patient to hand surgery for evaluation for carpal tunnel release.  Patient would like wrist splints to try in the interim, she understands the day would likely have limited utility given the severity of her median nerve neuropathy.\par \par Chiari I malformation\par \par I spent the time noted on the day of this patient encounter preparing for, providing and documenting the above E/M service and counseling and educate patient on differential, workup, disease course, and treatment/management. Education was provided to the patient during this encounter. All questions and concerns were answered and addressed in detail. The patient verbalized understanding and agreed to plan. Patient was advised to continue to monitor for neurologic symptoms and to notify my office or go to the nearest emergency room if there are any changes. Any orders/referrals and communications were provided as well. \par Side effects of the above medications were discussed in detail including but not limited to applicable black box warning and teratogenicity as appropriate. \par Patient was advised to bring previous records to my office, including CD of imaging, when applicable. \par \par

## 2021-07-09 NOTE — DATA REVIEWED
[de-identified] : nerve conduction EMG of the arm shows moderate right and severe left median nerve entrapment neuropathy with demyelinating features bilaterally.\par \par Nerve conduction EMG of the legs is limited secondary to anasarca, there is no gross electrodiagnostic evidence of lumbosacral radiculopathy.

## 2021-07-09 NOTE — HISTORY OF PRESENT ILLNESS
[FreeTextEntry1] : The patient is obese and has h/o of sleep apnea and is here for numbness and tingling in her whole body that comes and goes. She feels weakness in the arms and the legs. SHe was previously diagnosed with CTS bl. No weight loss or chills. She was also diagnosed with pre-DM.\par \par The patient also has occasional headaches. She was previously diagnosed with Chiari 1 malformation. \par \par The patient has not started Cymbalta yet. She is pending hem/onc eval for enlarged lymph nodes. Denies Vit B suppl. \par

## 2021-07-22 ENCOUNTER — APPOINTMENT (OUTPATIENT)
Dept: NEUROLOGY | Facility: CLINIC | Age: 66
End: 2021-07-22
Payer: MEDICARE

## 2021-07-22 VITALS
OXYGEN SATURATION: 95 % | HEART RATE: 98 BPM | WEIGHT: 293 LBS | BODY MASS INDEX: 47.09 KG/M2 | DIASTOLIC BLOOD PRESSURE: 94 MMHG | HEIGHT: 66 IN | TEMPERATURE: 97.4 F | SYSTOLIC BLOOD PRESSURE: 193 MMHG

## 2021-07-22 PROCEDURE — 11102 TANGNTL BX SKIN SINGLE LES: CPT

## 2021-07-22 NOTE — ASSESSMENT
[FreeTextEntry1] : Numbness and tingling in patient with pre-DM who also has bl CTS. nerve conduction EMG of the legs did not show a large fiber neuropathy, will plan to get skin biopsy to evaluate for small fiber neuropathy. Neuropathy labs notable for neuropathy labs notable for crp 0.83, esr 50, glu 111, HbA1C 6.8 and Vit B3 high. The patient also has possible enlarged parotids, will fu with heme/ onc. Will monitor vit B3, and will consider nutrition eval (no suppl).\par \par nerve conduction EMG of the arm shows moderate right and severe left median nerve entrapment neuropathy with demyelinating features bilaterally. We will refer the patient to hand surgery for evaluation for carpal tunnel release. Patient would like wrist splints to try in the interim, she understands the day would likely have limited utility given the severity of her median nerve neuropathy.\par \par Chiari I malformation\par

## 2021-07-22 NOTE — PROCEDURE
[FreeTextEntry3] : DIAGNOSIS: small fiber neuropathy \par \par POST-OP DIAGNOSIS: Same  \par \par PROCEDURE: skin biopsy \par \par Performing Physician: Faby Garcia MD, MPH \par \par  \par \par PROCEDURE:  \par \par _ Shave Biopsy _ Excisional Biopsy x_ [facility] \par \par   \par \par Informed consent was obtained from the patient. The area surrounding the skin lesion was prepared and draped in the usual sterile manner. The area was numbed with subcutaneous 1% Lidocaine.  The lesion was removed in the usual manner by the biopsy method noted above from the right distal leg and proximal thigh. Hemostasis was assured. The patient tolerated the procedure well. Skin biopsy sample to be send to [facility]. \par \par  \par \par Closure: _ Monsel’s for hemostasis _ suture _  _X None \par \par   \par \par Followup: The patient tolerated the procedure well without complications. Standard post-procedure care is explained and return precautions are given.

## 2021-11-03 ENCOUNTER — EMERGENCY (EMERGENCY)
Facility: HOSPITAL | Age: 66
LOS: 1 days | Discharge: HOME | End: 2021-11-03
Attending: EMERGENCY MEDICINE
Payer: COMMERCIAL

## 2021-11-03 VITALS
WEIGHT: 293 LBS | HEART RATE: 90 BPM | HEIGHT: 66 IN | RESPIRATION RATE: 22 BRPM | SYSTOLIC BLOOD PRESSURE: 155 MMHG | OXYGEN SATURATION: 97 % | TEMPERATURE: 98 F | DIASTOLIC BLOOD PRESSURE: 75 MMHG

## 2021-11-03 LAB
ALBUMIN SERPL ELPH-MCNC: 3.5 G/DL — SIGNIFICANT CHANGE UP (ref 3.5–5)
ALP SERPL-CCNC: 92 U/L — SIGNIFICANT CHANGE UP (ref 40–120)
ALT FLD-CCNC: 47 U/L DA — SIGNIFICANT CHANGE UP (ref 10–60)
ANION GAP SERPL CALC-SCNC: 4 MMOL/L — LOW (ref 5–17)
APPEARANCE UR: CLEAR — SIGNIFICANT CHANGE UP
AST SERPL-CCNC: 19 U/L — SIGNIFICANT CHANGE UP (ref 10–40)
BASOPHILS # BLD AUTO: 0.04 K/UL — SIGNIFICANT CHANGE UP (ref 0–0.2)
BASOPHILS NFR BLD AUTO: 0.4 % — SIGNIFICANT CHANGE UP (ref 0–2)
BILIRUB SERPL-MCNC: 0.3 MG/DL — SIGNIFICANT CHANGE UP (ref 0.2–1.2)
BILIRUB UR-MCNC: NEGATIVE — SIGNIFICANT CHANGE UP
BUN SERPL-MCNC: 22 MG/DL — HIGH (ref 7–18)
CALCIUM SERPL-MCNC: 9.8 MG/DL — SIGNIFICANT CHANGE UP (ref 8.4–10.5)
CHLORIDE SERPL-SCNC: 103 MMOL/L — SIGNIFICANT CHANGE UP (ref 96–108)
CO2 SERPL-SCNC: 31 MMOL/L — SIGNIFICANT CHANGE UP (ref 22–31)
COLOR SPEC: YELLOW — SIGNIFICANT CHANGE UP
CREAT SERPL-MCNC: 1.03 MG/DL — SIGNIFICANT CHANGE UP (ref 0.5–1.3)
DIFF PNL FLD: ABNORMAL
EOSINOPHIL # BLD AUTO: 0.11 K/UL — SIGNIFICANT CHANGE UP (ref 0–0.5)
EOSINOPHIL NFR BLD AUTO: 1 % — SIGNIFICANT CHANGE UP (ref 0–6)
GLUCOSE SERPL-MCNC: 116 MG/DL — HIGH (ref 70–99)
GLUCOSE UR QL: NEGATIVE — SIGNIFICANT CHANGE UP
HCT VFR BLD CALC: 44.5 % — SIGNIFICANT CHANGE UP (ref 34.5–45)
HGB BLD-MCNC: 13.9 G/DL — SIGNIFICANT CHANGE UP (ref 11.5–15.5)
IMM GRANULOCYTES NFR BLD AUTO: 0.5 % — SIGNIFICANT CHANGE UP (ref 0–1.5)
KETONES UR-MCNC: NEGATIVE — SIGNIFICANT CHANGE UP
LACTATE SERPL-SCNC: 1.7 MMOL/L — SIGNIFICANT CHANGE UP (ref 0.7–2)
LEUKOCYTE ESTERASE UR-ACNC: NEGATIVE — SIGNIFICANT CHANGE UP
LIDOCAIN IGE QN: 98 U/L — SIGNIFICANT CHANGE UP (ref 73–393)
LYMPHOCYTES # BLD AUTO: 2.93 K/UL — SIGNIFICANT CHANGE UP (ref 1–3.3)
LYMPHOCYTES # BLD AUTO: 27.7 % — SIGNIFICANT CHANGE UP (ref 13–44)
MCHC RBC-ENTMCNC: 26.2 PG — LOW (ref 27–34)
MCHC RBC-ENTMCNC: 31.2 GM/DL — LOW (ref 32–36)
MCV RBC AUTO: 83.8 FL — SIGNIFICANT CHANGE UP (ref 80–100)
MONOCYTES # BLD AUTO: 0.99 K/UL — HIGH (ref 0–0.9)
MONOCYTES NFR BLD AUTO: 9.4 % — SIGNIFICANT CHANGE UP (ref 2–14)
NEUTROPHILS # BLD AUTO: 6.46 K/UL — SIGNIFICANT CHANGE UP (ref 1.8–7.4)
NEUTROPHILS NFR BLD AUTO: 61 % — SIGNIFICANT CHANGE UP (ref 43–77)
NITRITE UR-MCNC: NEGATIVE — SIGNIFICANT CHANGE UP
NRBC # BLD: 0 /100 WBCS — SIGNIFICANT CHANGE UP (ref 0–0)
PH UR: 5 — SIGNIFICANT CHANGE UP (ref 5–8)
PLATELET # BLD AUTO: 213 K/UL — SIGNIFICANT CHANGE UP (ref 150–400)
POTASSIUM SERPL-MCNC: 3.8 MMOL/L — SIGNIFICANT CHANGE UP (ref 3.5–5.3)
POTASSIUM SERPL-SCNC: 3.8 MMOL/L — SIGNIFICANT CHANGE UP (ref 3.5–5.3)
PROT SERPL-MCNC: 8.6 G/DL — HIGH (ref 6–8.3)
PROT UR-MCNC: NEGATIVE — SIGNIFICANT CHANGE UP
RBC # BLD: 5.31 M/UL — HIGH (ref 3.8–5.2)
RBC # FLD: 15.6 % — HIGH (ref 10.3–14.5)
SODIUM SERPL-SCNC: 138 MMOL/L — SIGNIFICANT CHANGE UP (ref 135–145)
SP GR SPEC: 1.01 — SIGNIFICANT CHANGE UP (ref 1.01–1.02)
UROBILINOGEN FLD QL: NEGATIVE — SIGNIFICANT CHANGE UP
WBC # BLD: 10.58 K/UL — HIGH (ref 3.8–10.5)
WBC # FLD AUTO: 10.58 K/UL — HIGH (ref 3.8–10.5)

## 2021-11-03 PROCEDURE — 36415 COLL VENOUS BLD VENIPUNCTURE: CPT

## 2021-11-03 PROCEDURE — 80053 COMPREHEN METABOLIC PANEL: CPT

## 2021-11-03 PROCEDURE — 83605 ASSAY OF LACTIC ACID: CPT

## 2021-11-03 PROCEDURE — 81001 URINALYSIS AUTO W/SCOPE: CPT

## 2021-11-03 PROCEDURE — 74177 CT ABD & PELVIS W/CONTRAST: CPT | Mod: MA

## 2021-11-03 PROCEDURE — 87086 URINE CULTURE/COLONY COUNT: CPT

## 2021-11-03 PROCEDURE — 99285 EMERGENCY DEPT VISIT HI MDM: CPT

## 2021-11-03 PROCEDURE — 74177 CT ABD & PELVIS W/CONTRAST: CPT | Mod: 26,MA

## 2021-11-03 PROCEDURE — 85025 COMPLETE CBC W/AUTO DIFF WBC: CPT

## 2021-11-03 PROCEDURE — 83690 ASSAY OF LIPASE: CPT

## 2021-11-03 PROCEDURE — 99284 EMERGENCY DEPT VISIT MOD MDM: CPT | Mod: 25

## 2021-11-03 RX ORDER — IBUPROFEN 200 MG
600 TABLET ORAL ONCE
Refills: 0 | Status: COMPLETED | OUTPATIENT
Start: 2021-11-03 | End: 2021-11-03

## 2021-11-03 RX ADMIN — Medication 600 MILLIGRAM(S): at 21:58

## 2021-11-03 NOTE — ED ADULT TRIAGE NOTE - CHIEF COMPLAINT QUOTE
c/o bilateral lower leg pain radiating up to her pelvis x 2 days as per pt stated /also c/o burning in urination

## 2021-11-04 VITALS
OXYGEN SATURATION: 95 % | DIASTOLIC BLOOD PRESSURE: 84 MMHG | TEMPERATURE: 98 F | RESPIRATION RATE: 18 BRPM | HEART RATE: 86 BPM | SYSTOLIC BLOOD PRESSURE: 163 MMHG

## 2021-11-04 NOTE — ED PROVIDER NOTE - OBJECTIVE STATEMENT
66yoF with h/o HTN, asthma, pre-DM on metformin, presents with feeling of b/l leg pain radiating up to her groin area b/l x 5 days, especially with walking. Gen lower abdominal pain rene with eating and having a BM. Also notes some burning to her groin area. Possible small white vaginal discharge. Has not had sex in 1 yr. Associated dysuria. Denies fall/trauma, fever, v/d, LE swelling, recent long distance travel, CP, SOB, and all other symptoms.

## 2021-11-04 NOTE — ED PROVIDER NOTE - PATIENT PORTAL LINK FT
You can access the FollowMyHealth Patient Portal offered by [facility] by registering at the following website: [email or web address]. By joining [employer]’s FollowMyHealth portal, you will also be able to view your health information using other applications (apps) compatible with our system.

## 2021-11-04 NOTE — ED PROVIDER NOTE - NSFOLLOWUPINSTRUCTIONS_ED_ALL_ED_FT
Please follow up with your primary care doctor in 1-2 days.  Please use acetaminophen or ibuprofen as needed for pain.  Please return to the emergency department if you have severe worsening pain, vomiting, fever, shortness of breath, or any other symptoms.      Abdominal Pain    WHAT YOU NEED TO KNOW:    Abdominal pain can be dull, achy, or sharp. You may have pain in one area of your abdomen, or in your entire abdomen. Your pain may be caused by a condition such as constipation, food sensitivity or poisoning, infection, or a blockage. Abdominal pain can also be from a hernia, appendicitis, or an ulcer. Liver, gallbladder, or kidney conditions can also cause abdominal pain. The cause of your abdominal pain may be unknown.     DISCHARGE INSTRUCTIONS:    Return to the emergency department if:   •You have new chest pain or shortness of breath.  •You have pulsing pain in your upper abdomen or lower back that suddenly becomes constant.  •Your pain is in the right lower abdominal area and worsens with movement.   •You have a fever over 100.4°F (38°C) or shaking chills.   •You are vomiting and cannot keep food or liquids down.   •Your pain does not improve or gets worse over the next 8 to 12 hours.   •You see blood in your vomit or bowel movements, or they look black and tarry.   •Your skin or the whites of your eyes turn yellow.   •You are a woman and have a large amount of vaginal bleeding that is not your monthly period.     Contact your healthcare provider if:   •You have pain in your lower back.   •You are a man and have pain in your testicles.  •You have pain when you urinate.   •You have questions or concerns about your condition or care.    Follow up with your healthcare provider within 24 hours or as directed: Write down your questions so you remember to ask them during your visits.     Medicines:   •Medicines may be given to calm your stomach and prevent vomiting or to decrease pain. Ask how to take pain medicine safely.  •Take your medicine as directed. Contact your healthcare provider if you think your medicine is not helping or if you have side effects. Tell him of her if you are allergic to any medicine. Keep a list of the medicines, vitamins, and herbs you take. Include the amounts, and when and why you take them. Bring the list or the pill bottles to follow-up visits. Carry your medicine list with you in case of an emergency.

## 2021-11-04 NOTE — ED PROVIDER NOTE - PHYSICAL EXAMINATION
Afebrile, hemodynamically stable, saturating well  NAD, well appearing, sitting comfortably in bed, no WOB, speaking full sentences  Head NCAT  EOMI grossly, anicteric  MMM  No JVD  RRR, nml S1/S2, no m/r/g  Lungs CTAB, no w/r/r  Abd soft, entirely NT, ND, obese, nml BS, no rebound or guarding, no CVAT   (PCA Karla as chaperone): nml external genitalia, no erythema, crepitus, TTP, or discharge, no vaginal discharge on manual exam  AAO, CN's 3-12 grossly intact, motor 5/5 in all extrems  EDWARDS spontaneously, b/l PVD with no edema  Skin warm, dry

## 2021-11-04 NOTE — ED PROVIDER NOTE - CLINICAL SUMMARY MEDICAL DECISION MAKING FREE TEXT BOX
No change in baseline LE and no edema, and no risk factors for DVT to suggest PE. No e/o PAD. No e/o cellulitis or nec fasc. No abdominal tenderness on initial or rpt exam with low suspicion for acute process, and CT negative. No e/o pelvic fungal or yeast infection. No sex or discharge, with low suspicion for STI, and no CMT. Low suspicion for UTI. Character and exam low suspicion for central cord involvement, and no e/o cord compression. Patient is well appearing, NAD, afebrile, hemodynamically stable. Any available tests and studies were discussed with patient and daughter. Discharged with instructions in further symptomatic care, return precautions, and need for PMD f/u.

## 2021-11-07 LAB
CULTURE RESULTS: SIGNIFICANT CHANGE UP
SPECIMEN SOURCE: SIGNIFICANT CHANGE UP

## 2021-11-09 ENCOUNTER — APPOINTMENT (OUTPATIENT)
Dept: NEUROLOGY | Facility: CLINIC | Age: 66
End: 2021-11-09
Payer: MEDICARE

## 2021-11-09 VITALS
DIASTOLIC BLOOD PRESSURE: 70 MMHG | WEIGHT: 293 LBS | SYSTOLIC BLOOD PRESSURE: 136 MMHG | HEIGHT: 66 IN | TEMPERATURE: 97.2 F | BODY MASS INDEX: 47.09 KG/M2 | RESPIRATION RATE: 90 BRPM | OXYGEN SATURATION: 97 % | HEART RATE: 83 BPM

## 2021-11-09 DIAGNOSIS — R20.2 ANESTHESIA OF SKIN: ICD-10-CM

## 2021-11-09 DIAGNOSIS — G93.5 COMPRESSION OF BRAIN: ICD-10-CM

## 2021-11-09 DIAGNOSIS — R20.0 ANESTHESIA OF SKIN: ICD-10-CM

## 2021-11-09 DIAGNOSIS — G56.00 CARPAL TUNNEL SYNDROME, UNSPECIFIED UPPER LIMB: ICD-10-CM

## 2021-11-09 PROCEDURE — 99214 OFFICE O/P EST MOD 30 MIN: CPT

## 2021-11-09 NOTE — ASSESSMENT
[FreeTextEntry1] : Numbness and tingling in patient with pre-DM who also has bl CTS. nerve conduction EMG of the legs did not show a large fiber neuropathy,  skin biopsy was cw small fiber neuropathy. Neuropathy labs notable for neuropathy labs notable for crp 0.83, esr 50, glu 111, HbA1C 6.8 and Vit B3 high. The patient also has possible enlarged parotids, will fu with heme/ onc. Will monitor vit B3, and will consider nutrition eval (no suppl).\par \par nerve conduction EMG of the arm shows moderate right and severe left median nerve entrapment neuropathy with demyelinating features bilaterally. We will refer the patient to hand surgery for evaluation for carpal tunnel release. Patient would like wrist splints to try in the interim, she understands the day would likely have limited utility given the severity of her median nerve neuropathy.\par \par Chiari I malformation\par \par I spent the time noted on the day of this patient encounter preparing for, providing and documenting the above E/M service and counseling and educate patient on differential, workup, disease course, and treatment/management. Education was provided to the patient during this encounter. All questions and concerns were answered and addressed in detail. The patient verbalized understanding and agreed to plan. Patient was advised to continue to monitor for neurologic symptoms and to notify my office or go to the nearest emergency room if there are any changes. Any orders/referrals and communications were provided as well. \par Side effects of the above medications were discussed in detail including but not limited to applicable black box warning and teratogenicity as appropriate. \par Patient was advised to bring previous records to my office, including CD of imaging, when applicable.

## 2021-11-09 NOTE — HISTORY OF PRESENT ILLNESS
[FreeTextEntry1] : The patient is obese and has h/o of sleep apnea and is here for numbness and tingling in her whole body that comes and goes. She feels weakness in the arms and the legs. SHe was previously diagnosed with CTS bl. No weight loss or chills. She was also diagnosed with pre-DM.  \par \par The patient also has occasional headaches. She was previously diagnosed with Chiari 1 malformation. \par \par The patient has not started Cymbalta yet. She is pending hem/onc eval for enlarged lymph nodes. Denies Vit B suppl. \par \par No clinical change since last visit.\par \par Patient has not tried the wrist splints yet and she has not yet seen the hand surgery nor does her hematologist oncologist.

## 2021-11-24 ENCOUNTER — RESULT REVIEW (OUTPATIENT)
Age: 66
End: 2021-11-24

## 2021-12-01 ENCOUNTER — APPOINTMENT (OUTPATIENT)
Dept: ORTHOPEDIC SURGERY | Facility: CLINIC | Age: 66
End: 2021-12-01

## 2022-05-09 ENCOUNTER — APPOINTMENT (OUTPATIENT)
Dept: NEUROLOGY | Facility: CLINIC | Age: 67
End: 2022-05-09

## 2022-07-26 NOTE — ED PROVIDER NOTE - DISCUSSED CLINICAL AND RADIOLOGICAL FINDINGS WITH, MDM
Caller would like to discuss an/a Return call for heart concerns . Writer advised caller of callback within 24-72 hours.    Patient Name: Stu Marquez  Caller Name: self  Name of Facility: [facility]  Tyesha Response: N/A  Callback Number: [phone]  Best Availability: any  Can A Detailed Message Be left? yes  Fax Number: na  Additional Info: Caller has some concerns about recent vertigo visit which provider indicated it may be her heart. Patient wants to speak with provider about dental appointment today and if it is okay to proceed. Writer unable to assist further . Please call caller back dental appointment is at 11 am   Did you confirm the message with the caller?: yes    Thank you,  Nemo Zazueta     patient

## 2022-08-22 ENCOUNTER — OUTPATIENT (OUTPATIENT)
Dept: OUTPATIENT SERVICES | Facility: HOSPITAL | Age: 67
LOS: 1 days | End: 2022-08-22
Payer: COMMERCIAL

## 2022-08-22 ENCOUNTER — TRANSCRIPTION ENCOUNTER (OUTPATIENT)
Age: 67
End: 2022-08-22

## 2022-08-22 VITALS
RESPIRATION RATE: 18 BRPM | SYSTOLIC BLOOD PRESSURE: 153 MMHG | OXYGEN SATURATION: 95 % | HEIGHT: 63 IN | DIASTOLIC BLOOD PRESSURE: 82 MMHG | WEIGHT: 293 LBS | HEART RATE: 77 BPM | TEMPERATURE: 98 F

## 2022-08-22 DIAGNOSIS — G47.33 OBSTRUCTIVE SLEEP APNEA (ADULT) (PEDIATRIC): ICD-10-CM

## 2022-08-22 DIAGNOSIS — N95.0 POSTMENOPAUSAL BLEEDING: ICD-10-CM

## 2022-08-22 DIAGNOSIS — J45.909 UNSPECIFIED ASTHMA, UNCOMPLICATED: ICD-10-CM

## 2022-08-22 DIAGNOSIS — Z01.818 ENCOUNTER FOR OTHER PREPROCEDURAL EXAMINATION: ICD-10-CM

## 2022-08-22 DIAGNOSIS — E11.9 TYPE 2 DIABETES MELLITUS WITHOUT COMPLICATIONS: ICD-10-CM

## 2022-08-22 DIAGNOSIS — Z29.9 ENCOUNTER FOR PROPHYLACTIC MEASURES, UNSPECIFIED: ICD-10-CM

## 2022-08-22 DIAGNOSIS — I10 ESSENTIAL (PRIMARY) HYPERTENSION: ICD-10-CM

## 2022-08-22 LAB — BLD GP AB SCN SERPL QL: SIGNIFICANT CHANGE UP

## 2022-08-22 PROCEDURE — G0463: CPT

## 2022-08-22 RX ORDER — METFORMIN HYDROCHLORIDE 850 MG/1
1 TABLET ORAL
Qty: 0 | Refills: 0 | DISCHARGE

## 2022-08-22 RX ORDER — SODIUM CHLORIDE 9 MG/ML
3 INJECTION INTRAMUSCULAR; INTRAVENOUS; SUBCUTANEOUS EVERY 8 HOURS
Refills: 0 | Status: DISCONTINUED | OUTPATIENT
Start: 2022-09-06 | End: 2022-09-20

## 2022-08-22 NOTE — H&P PST ADULT - MEDICATION ADMINISTRATION INFO, PROFILE
Subjective:       Patient ID: Daniela Harris is a 80 y.o. female.    Chief Complaint: No chief complaint on file.  Daniela Harris 80 y.o. female is here for office visit to review care and physical exam, saw neuro, started new med for memory, has f/u appt.  Sometimes noncompliance with meds?  Had ER visit for HTN.  Feels well, no new c/u ROS (-).      HPI  Review of Systems   Constitutional: Negative for activity change, fatigue, fever and unexpected weight change.   HENT: Negative for congestion, hearing loss, postnasal drip and rhinorrhea.    Eyes: Negative for redness and visual disturbance.   Respiratory: Negative for chest tightness, shortness of breath and wheezing.    Cardiovascular: Negative for chest pain, palpitations and leg swelling.   Gastrointestinal: Negative for abdominal distention.   Genitourinary: Negative for decreased urine volume, dysuria, flank pain, hematuria, pelvic pain and urgency.   Musculoskeletal: Negative for back pain, gait problem, joint swelling and neck stiffness.   Skin: Negative for color change, rash and wound.   Neurological: Negative for dizziness, syncope, weakness and headaches.   Psychiatric/Behavioral: Negative for behavioral problems, confusion and sleep disturbance. The patient is not nervous/anxious.        Objective:      Physical Exam   Constitutional: She is oriented to person, place, and time. She appears well-developed and well-nourished. No distress.   HENT:   Head: Normocephalic.   Mouth/Throat: No oropharyngeal exudate.   Eyes: EOM are normal. Pupils are equal, round, and reactive to light. No scleral icterus.   Neck: Neck supple. No JVD present. No thyromegaly present.   Cardiovascular: Normal rate, regular rhythm and normal heart sounds.  Exam reveals no gallop and no friction rub.    No murmur heard.  Pulmonary/Chest: Effort normal and breath sounds normal. She has no wheezes. She has no rales.   Abdominal: Soft. Bowel sounds are normal. She exhibits no  distension and no mass. There is no tenderness. There is no guarding.   Musculoskeletal: Normal range of motion. She exhibits no edema.   Lymphadenopathy:     She has no cervical adenopathy.   Neurological: She is alert and oriented to person, place, and time. She has normal reflexes. She displays normal reflexes. No cranial nerve deficit. She exhibits normal muscle tone.   Skin: Skin is warm. No rash noted. No erythema.   Psychiatric: She has a normal mood and affect. Thought content normal.       Assessment:       No diagnosis found.    Plan:       Diagnoses and all orders for this visit:    HTN (hypertension), benign  -     Comprehensive metabolic panel; Future    Hyperlipidemia, unspecified hyperlipidemia type  -     atorvastatin (LIPITOR) 40 MG tablet; Take 1 tablet (40 mg total) by mouth once daily.  -     Comprehensive metabolic panel; Future  -     Lipid panel; Future    Acute ischemic stroke  -     atorvastatin (LIPITOR) 40 MG tablet; Take 1 tablet (40 mg total) by mouth once daily.  -     Comprehensive metabolic panel; Future  -     Lipid panel; Future    Gastric ulcer, unspecified chronicity, unspecified whether gastric ulcer hemorrhage or perforation present  -     CBC auto differential; Future    CRF (chronic renal failure), unspecified stage  -     Comprehensive metabolic panel; Future  -     CBC auto differential; Future  -     Ambulatory Referral to Nephrology    Hypothyroidism, unspecified type  -     TSH; Future       no concerns

## 2022-08-22 NOTE — H&P PST ADULT - PROBLEM SELECTOR PLAN 4
Patient denies ever have an asthma attack, ever been intubated  However, has COPD  She is advised to continue her inhalers, nebulizer as prescribed  To bring the inhalers the day of the procedure

## 2022-08-22 NOTE — H&P PST ADULT - HISTORY OF PRESENT ILLNESS
66 yo female with history of HTN, DM, Asthma (no asthma attack, no hospitalization, no intubation), CHICA (uses CPAP), Morbid Obesity, reports the above.  Patient states had vaginal bleeding x 1 one this time.  Patient states had a D & C in 20218 for the same reason.  She is scheduled for : Dilation and Curettage   Hysteroscopy, on 9/6/22

## 2022-08-22 NOTE — H&P PST ADULT - PROBLEM SELECTOR PLAN 2
Patient states BP is stable with hydralazine, losartan, torsemide, spironolactone, labetalol  Continue all medications  Maintain f/u appointment with provider for BP monitoring

## 2022-09-05 ENCOUNTER — TRANSCRIPTION ENCOUNTER (OUTPATIENT)
Age: 67
End: 2022-09-05

## 2022-09-06 ENCOUNTER — TRANSCRIPTION ENCOUNTER (OUTPATIENT)
Age: 67
End: 2022-09-06

## 2022-09-06 ENCOUNTER — OUTPATIENT (OUTPATIENT)
Dept: OUTPATIENT SERVICES | Facility: HOSPITAL | Age: 67
LOS: 1 days | End: 2022-09-06
Payer: COMMERCIAL

## 2022-09-06 ENCOUNTER — RESULT REVIEW (OUTPATIENT)
Age: 67
End: 2022-09-06

## 2022-09-06 VITALS
HEIGHT: 63 IN | OXYGEN SATURATION: 97 % | RESPIRATION RATE: 16 BRPM | DIASTOLIC BLOOD PRESSURE: 57 MMHG | SYSTOLIC BLOOD PRESSURE: 125 MMHG | HEART RATE: 78 BPM | WEIGHT: 293 LBS | TEMPERATURE: 98 F

## 2022-09-06 VITALS
OXYGEN SATURATION: 96 % | RESPIRATION RATE: 14 BRPM | DIASTOLIC BLOOD PRESSURE: 50 MMHG | SYSTOLIC BLOOD PRESSURE: 150 MMHG | HEART RATE: 75 BPM

## 2022-09-06 DIAGNOSIS — Z01.818 ENCOUNTER FOR OTHER PREPROCEDURAL EXAMINATION: ICD-10-CM

## 2022-09-06 DIAGNOSIS — N95.0 POSTMENOPAUSAL BLEEDING: ICD-10-CM

## 2022-09-06 LAB
BLD GP AB SCN SERPL QL: SIGNIFICANT CHANGE UP
GLUCOSE BLDC GLUCOMTR-MCNC: 100 MG/DL — HIGH (ref 70–99)
GLUCOSE BLDC GLUCOMTR-MCNC: 147 MG/DL — HIGH (ref 70–99)

## 2022-09-06 PROCEDURE — 86901 BLOOD TYPING SEROLOGIC RH(D): CPT

## 2022-09-06 PROCEDURE — 82962 GLUCOSE BLOOD TEST: CPT

## 2022-09-06 PROCEDURE — 86900 BLOOD TYPING SEROLOGIC ABO: CPT

## 2022-09-06 PROCEDURE — 88305 TISSUE EXAM BY PATHOLOGIST: CPT

## 2022-09-06 PROCEDURE — 88305 TISSUE EXAM BY PATHOLOGIST: CPT | Mod: 26

## 2022-09-06 PROCEDURE — 86850 RBC ANTIBODY SCREEN: CPT

## 2022-09-06 PROCEDURE — 58558 HYSTEROSCOPY BIOPSY: CPT

## 2022-09-06 PROCEDURE — 36415 COLL VENOUS BLD VENIPUNCTURE: CPT

## 2022-09-06 DEVICE — MYOSURE TISSUE REMOVAL DEVICE REACH: Type: IMPLANTABLE DEVICE | Status: FUNCTIONAL

## 2022-09-06 DEVICE — MYOSURE TISSUE REMOVAL FMS FOR FLUENT XL: Type: IMPLANTABLE DEVICE | Status: FUNCTIONAL

## 2022-09-06 RX ORDER — PANTOPRAZOLE SODIUM 20 MG/1
1 TABLET, DELAYED RELEASE ORAL
Qty: 30 | Refills: 0
Start: 2022-09-06 | End: 2022-10-05

## 2022-09-06 RX ORDER — BUDESONIDE AND FORMOTEROL FUMARATE DIHYDRATE 160; 4.5 UG/1; UG/1
2 AEROSOL RESPIRATORY (INHALATION)
Qty: 0 | Refills: 0 | DISCHARGE

## 2022-09-06 RX ORDER — ACETAMINOPHEN 500 MG
2 TABLET ORAL
Qty: 40 | Refills: 1
Start: 2022-09-06 | End: 2022-09-15

## 2022-09-06 RX ORDER — ONDANSETRON 8 MG/1
4 TABLET, FILM COATED ORAL ONCE
Refills: 0 | Status: DISCONTINUED | OUTPATIENT
Start: 2022-09-06 | End: 2022-09-06

## 2022-09-06 RX ORDER — ALBUTEROL 90 UG/1
3 AEROSOL, METERED ORAL
Qty: 0 | Refills: 0 | DISCHARGE

## 2022-09-06 RX ORDER — LOSARTAN POTASSIUM 100 MG/1
1 TABLET, FILM COATED ORAL
Qty: 0 | Refills: 0 | DISCHARGE

## 2022-09-06 RX ORDER — METFORMIN HYDROCHLORIDE 850 MG/1
2 TABLET ORAL
Qty: 0 | Refills: 0 | DISCHARGE

## 2022-09-06 RX ORDER — SPIRONOLACTONE 25 MG/1
1 TABLET, FILM COATED ORAL
Qty: 0 | Refills: 0 | DISCHARGE

## 2022-09-06 RX ORDER — HYDRALAZINE HCL 50 MG
1 TABLET ORAL
Qty: 0 | Refills: 0 | DISCHARGE

## 2022-09-06 RX ORDER — ALBUTEROL 90 UG/1
2 AEROSOL, METERED ORAL
Qty: 0 | Refills: 0 | DISCHARGE

## 2022-09-06 RX ORDER — IBUPROFEN 200 MG
1 TABLET ORAL
Qty: 8 | Refills: 0
Start: 2022-09-06 | End: 2022-09-07

## 2022-09-06 RX ORDER — ASPIRIN/CALCIUM CARB/MAGNESIUM 324 MG
1 TABLET ORAL
Qty: 0 | Refills: 0 | DISCHARGE

## 2022-09-06 RX ORDER — CHOLECALCIFEROL (VITAMIN D3) 125 MCG
1 CAPSULE ORAL
Qty: 0 | Refills: 0 | DISCHARGE

## 2022-09-06 RX ORDER — LABETALOL HCL 100 MG
1 TABLET ORAL
Qty: 0 | Refills: 0 | DISCHARGE

## 2022-09-06 RX ORDER — OXYCODONE HYDROCHLORIDE 5 MG/1
5 TABLET ORAL ONCE
Refills: 0 | Status: DISCONTINUED | OUTPATIENT
Start: 2022-09-06 | End: 2022-09-06

## 2022-09-06 RX ADMIN — SODIUM CHLORIDE 3 MILLILITER(S): 9 INJECTION INTRAMUSCULAR; INTRAVENOUS; SUBCUTANEOUS at 14:07

## 2022-09-06 NOTE — ASU DISCHARGE PLAN (ADULT/PEDIATRIC) - CARE PROVIDER_API CALL
Eric Pop)  Obstetrics and Gynecology  [phone] [place] [address]  Phone: [phone]  Fax: [phone]  Established Patient  Follow Up Time: 2 weeks

## 2022-09-06 NOTE — BRIEF OPERATIVE NOTE - NSICDXBRIEFPOSTOP_GEN_ALL_CORE_FT
POST-OP DIAGNOSIS:  Postmenopausal bleeding 06-Sep-2022 17:02:27  Eric Pop  Asherman syndrome 06-Sep-2022 17:02:36  Eric Pop

## 2022-09-06 NOTE — ASU PATIENT PROFILE, ADULT - FALL HARM RISK - HARM RISK INTERVENTIONS

## 2022-09-08 LAB — SURGICAL PATHOLOGY STUDY: SIGNIFICANT CHANGE UP

## 2022-09-16 NOTE — ED PROVIDER NOTE - AGGRAVATING FACTORS
General Examination: General appearance: -> alert, pleasant, well-nourished and in no acute distress , alert, pleasant, well-nourished and in no acute distress   Head: -> normocephalic, atraumatic , normocephalic, atraumatic   Eyes: -> pupils equal, round, reactive to light and accommodation, sclera anicteric , pupils equal, round, reactive to light and accommodation, sclera anicteric   Ears: -> normal , normal   Oral cavity: -> mucosa moist , mucosa moist   Neck / thyroid: ->    Rectal: -> not examined  , not examined    Extremities: -> normal extremity with no clubbing, cyanosis or edema , normal extremity with no clubbing, cyanosis or edema   Neurologic: -> alert and oriented, normal exam with no motor or sensory deficits , alert and oriented, normal exam with no motor or sensory deficits   Lymph nodes: ->    Skin: -> skin is warm and dry, with no rashes, good skin turgor and normal hair distribution, with no suspicious skin lesions , skin is warm and dry, with no rashes, good skin turgor and normal hair distribution, with no suspicious skin lesions   Heart: -> regular rate and rhythm without murmurs, gallops, clicks or rubs , regular rate and rhythm without murmurs, gallops, clicks or rubs   Lungs: -> clear to auscultation bilaterally, with good air movement and no rales, rhonchi or wheezes , clear to auscultation bilaterally, with good air movement and no rales, rhonchi or wheezes   Breasts: ->    Abdomen: -> soft with good bowel sounds, nontender, and no masses or hepatosplenomegaly , soft with good bowel sounds, nontender, and no masses or hepatosplenomegaly       none

## 2022-10-13 ENCOUNTER — OUTPATIENT (OUTPATIENT)
Dept: OUTPATIENT SERVICES | Facility: HOSPITAL | Age: 67
LOS: 1 days | End: 2022-10-13
Payer: COMMERCIAL

## 2022-10-13 ENCOUNTER — APPOINTMENT (OUTPATIENT)
Dept: MRI IMAGING | Facility: HOSPITAL | Age: 67
End: 2022-10-13

## 2022-10-13 DIAGNOSIS — N85.6 INTRAUTERINE SYNECHIAE: ICD-10-CM

## 2022-10-13 PROCEDURE — 72197 MRI PELVIS W/O & W/DYE: CPT

## 2022-10-13 PROCEDURE — 72197 MRI PELVIS W/O & W/DYE: CPT | Mod: 26

## 2022-10-13 PROCEDURE — A9585: CPT

## 2023-03-18 NOTE — ED ADULT NURSE NOTE - NSIMPLEMENTINTERV_GEN_ALL_ED
PAST SURGICAL HISTORY:  H/O exploratory laparotomy     H/O LEEP     H/O ventral hernia repair     H/O:      History of D&C     
Implemented All Universal Safety Interventions:  [place] to call system. Call bell, personal items and telephone within reach. Instruct patient to call for assistance. Room bathroom lighting operational. Non-slip footwear when patient is off stretcher. Physically safe environment: no spills, clutter or unnecessary equipment. Stretcher in lowest position, wheels locked, appropriate side rails in place.

## 2023-04-13 NOTE — ASU DISCHARGE PLAN (ADULT/PEDIATRIC). - MEDICATION SUMMARY - MEDICATIONS TO STOP TAKING
Hold for [facility].     Mar Mata PA-C     I will STOP taking the medications listed below when I get home from the hospital:  None

## 2023-09-11 ENCOUNTER — OUTPATIENT (OUTPATIENT)
Dept: OUTPATIENT SERVICES | Facility: HOSPITAL | Age: 68
LOS: 1 days | End: 2023-09-11
Payer: COMMERCIAL

## 2023-09-11 DIAGNOSIS — I10 ESSENTIAL (PRIMARY) HYPERTENSION: ICD-10-CM

## 2023-09-11 PROCEDURE — 93306 TTE W/DOPPLER COMPLETE: CPT | Mod: 26

## 2023-09-11 PROCEDURE — 93306 TTE W/DOPPLER COMPLETE: CPT

## 2023-09-21 NOTE — ED ADULT NURSE NOTE - PRIMARY CARE PROVIDER
Orthopaedic Progress Note      CHIEF COMPLAINT: Left midshaft clavicle fracture    HISTORY OF PRESENT ILLNESS:       Ms. Blaise Riedel  is a 7 y.o. female  who presents with AN initial left midshaft clavicle fracture. She is here today with her mother and grandmother. Patient was noted to have a fall off of a ladder near the bottom on Monday. She did have x-rays which did confirm a left midshaft clavicle fracture. There is some displacement but overall good stability for nonop treatment. She was given a sling. She has been nonweightbearing. She is a right-handed dominant individual.  She has been staying out of recess and gym. She denies any numbness or tingling. Denies pain elsewhere down the left arm. Past Medical History:    History reviewed. No pertinent past medical history. Past Surgical History:    History reviewed. No pertinent surgical history. Current  Medications:  No current outpatient medications on file. No current facility-administered medications for this visit. Allergies:  Seasonal    Social History:   Social History     Tobacco Use   Smoking Status Never   Smokeless Tobacco Never     Social History     Substance and Sexual Activity   Alcohol Use No    Alcohol/week: 0.0 standard drinks of alcohol     Social History     Substance and Sexual Activity   Drug Use No       Family History:  Family History   Problem Relation Age of Onset    High Blood Pressure Father     High Blood Pressure Maternal Grandmother     Diabetes Maternal Grandmother     High Blood Pressure Maternal Grandfather     Diabetes Maternal Grandfather        REVIEW OF SYSTEMS:  Constitutional: Denies any fever, chills. Musculoskeletal: Positive for left clavicle pain. PHYSICAL EXAM:  [unfilled]  General appearance:  Alert and oriented x 3. No apparent distress, appears stated age, calm and cooperative. Musculoskeletal: Clavicle was examined. Skin is intact no rashes lesions or drainage. Paolo Edge

## 2023-12-27 NOTE — ED PROVIDER NOTE - DISCHARGE REVIEW MATERIAL PRESENTED
Focus Note:  Patient presented to the clinic following a cardiology follow up with complaints of worsening bilateral arm rash. Rash was noticed several days ago, advised by staff on Friday to utilize OTC Hydrocortisone cream. Over the weekend and today, rash noticeably worse, redness increased, pain and swelling in hands.   Upon assessment, patient with several areas of red patches and papules to bilateral arms along with erythema, swelling and pain to several knuckles.          Patient's dermatology clinic closed to today. Patient instructed to stop Hydrocortisone, wear short sleeve shirt, take Claritin 10 mg daily, use only lukewarm water when bathing, utilize scant amount of unscented moisturizer to affected areas. Will reach out for their recommendations from Dermatology tomorrow. Patient aware to call or go to Urgent Care should symptoms worsen.     .

## 2024-10-10 NOTE — ED PROVIDER NOTE - AGGRAVATING FACTORS
Yvette Stroud is a 11 year old female who was brought in for this visit.  History was provided by the parent  HPI:     Chief Complaint   Patient presents with    Fever   Cold sx no dysuria  Medications Ordered Prior to Encounter[1]    Allergies  Allergies[2]        PHYSICAL EXAM:   Temp 96.8 °F (36 °C)   Wt 53 kg (116 lb 12.8 oz)     Constitutional: Well Hydrated in no distress  Eyes: no discharge noted  Ears: nl tms bilat  Nose/Throat: Normal tonsils clear pnd    Neck/Thyroid: Normal, no lymphadenopathy  Respiratory: Normal  Cardiovascular: Normal  Abdomen: Normal  Skin:  No rash  Psychiatric: Normal        ASSESSMENT/PLAN:       ICD-10-CM    1. Viral syndrome  B34.9       Supportive care  F/u prn      Patient/parent questions answered and states understanding of instructions.  Call office if condition worsens or new symptoms, or if parent concerned.  Reviewed return precautions.    Results From Past 48 Hours:  No results found for this or any previous visit (from the past 48 hours).    Orders Placed This Visit:  No orders of the defined types were placed in this encounter.      No follow-ups on file.      10/10/2024  Ferny Maravilla DO             [1]   Current Outpatient Medications on File Prior to Visit   Medication Sig Dispense Refill    ondansetron 4 MG Oral Tablet Dispersible Take 1 tablet (4 mg total) by mouth 2 (two) times daily as needed for Nausea. 10 tablet 0    sulfamethoxazole-trimethoprim -160 MG Oral Tab per tablet GIVE 1 TABLET BY MOUTH TWICE DAILY UNTIL GONE (Patient not taking: Reported on 4/19/2024)      Albuterol Sulfate HFA (PROVENTIL HFA) 108 (90 Base) MCG/ACT Inhalation Aero Soln Inhale 2 puffs into the lungs every 4 (four) hours as needed for Wheezing or Shortness of Breath. (Patient not taking: Reported on 7/27/2023) 2 each 0    albuterol sulfate (2.5 MG/3ML) 0.083% Inhalation Nebu Soln Take 3 mL (2.5 mg total) by nebulization every 4 (four) hours as needed for Wheezing or  Shortness of Breath. (Patient not taking: Reported on 7/27/2023) 1 each 1    Spacer/Aero-Holding Chambers (AEROCHAMBER PLUS LARISSA-VU) Does not apply Misc 1 applicator by Does not apply route as needed. With mask (Patient not taking: Reported on 7/27/2023) 1 each 0     No current facility-administered medications on file prior to visit.   [2] No Known Allergies     none

## 2025-01-09 ENCOUNTER — APPOINTMENT (OUTPATIENT)
Age: 70
End: 2025-01-09

## 2025-01-09 VITALS
OXYGEN SATURATION: 97 % | DIASTOLIC BLOOD PRESSURE: 82 MMHG | HEART RATE: 90 BPM | WEIGHT: 293 LBS | BODY MASS INDEX: 47.09 KG/M2 | SYSTOLIC BLOOD PRESSURE: 179 MMHG | RESPIRATION RATE: 14 BRPM | TEMPERATURE: 98 F | HEIGHT: 66 IN

## 2025-01-09 PROCEDURE — ZZZZZ: CPT

## 2025-01-09 PROCEDURE — 94060 EVALUATION OF WHEEZING: CPT

## 2025-01-09 PROCEDURE — 94729 DIFFUSING CAPACITY: CPT

## 2025-01-09 PROCEDURE — 94726 PLETHYSMOGRAPHY LUNG VOLUMES: CPT

## 2025-01-09 PROCEDURE — 99214 OFFICE O/P EST MOD 30 MIN: CPT | Mod: 25

## 2025-01-28 ENCOUNTER — APPOINTMENT (OUTPATIENT)
Dept: SLEEP CENTER | Facility: HOSPITAL | Age: 70
End: 2025-01-28

## 2025-01-28 ENCOUNTER — OUTPATIENT (OUTPATIENT)
Dept: OUTPATIENT SERVICES | Facility: HOSPITAL | Age: 70
LOS: 1 days | End: 2025-01-28
Payer: MEDICARE

## 2025-01-28 DIAGNOSIS — G47.33 OBSTRUCTIVE SLEEP APNEA (ADULT) (PEDIATRIC): ICD-10-CM

## 2025-01-28 PROCEDURE — 95811 POLYSOM 6/>YRS CPAP 4/> PARM: CPT | Mod: 26

## 2025-01-28 PROCEDURE — 95811 POLYSOM 6/>YRS CPAP 4/> PARM: CPT

## 2025-02-06 ENCOUNTER — APPOINTMENT (OUTPATIENT)
Age: 70
End: 2025-02-06
Payer: MEDICARE

## 2025-02-06 VITALS
DIASTOLIC BLOOD PRESSURE: 91 MMHG | TEMPERATURE: 98.1 F | HEIGHT: 66 IN | SYSTOLIC BLOOD PRESSURE: 189 MMHG | WEIGHT: 293 LBS | OXYGEN SATURATION: 98 % | RESPIRATION RATE: 14 BRPM | HEART RATE: 86 BPM | BODY MASS INDEX: 47.09 KG/M2

## 2025-02-06 DIAGNOSIS — G47.33 OBSTRUCTIVE SLEEP APNEA (ADULT) (PEDIATRIC): ICD-10-CM

## 2025-02-06 DIAGNOSIS — E66.01 MORBID (SEVERE) OBESITY DUE TO EXCESS CALORIES: ICD-10-CM

## 2025-02-06 DIAGNOSIS — J44.9 CHRONIC OBSTRUCTIVE PULMONARY DISEASE, UNSPECIFIED: ICD-10-CM

## 2025-02-06 PROCEDURE — G2211 COMPLEX E/M VISIT ADD ON: CPT

## 2025-02-06 PROCEDURE — 99214 OFFICE O/P EST MOD 30 MIN: CPT

## 2025-02-06 RX ORDER — FLUTICASONE FUROATE, UMECLIDINIUM BROMIDE AND VILANTEROL TRIFENATATE 100; 62.5; 25 UG/1; UG/1; UG/1
100-62.5-25 POWDER RESPIRATORY (INHALATION) DAILY
Qty: 1 | Refills: 3 | Status: ACTIVE | COMMUNITY
Start: 2025-02-06 | End: 1900-01-01

## 2025-04-28 ENCOUNTER — OUTPATIENT (OUTPATIENT)
Dept: OUTPATIENT SERVICES | Facility: HOSPITAL | Age: 70
LOS: 1 days | End: 2025-04-28
Payer: COMMERCIAL

## 2025-04-28 ENCOUNTER — RESULT REVIEW (OUTPATIENT)
Age: 70
End: 2025-04-28

## 2025-04-28 DIAGNOSIS — C80.1 MALIGNANT (PRIMARY) NEOPLASM, UNSPECIFIED: ICD-10-CM

## 2025-04-28 PROCEDURE — 88321 CONSLTJ&REPRT SLD PREP ELSWR: CPT

## 2025-05-01 LAB — SURGICAL PATHOLOGY STUDY: SIGNIFICANT CHANGE UP

## 2025-05-15 ENCOUNTER — APPOINTMENT (OUTPATIENT)
Dept: GYNECOLOGIC ONCOLOGY | Facility: CLINIC | Age: 70
End: 2025-05-15
Payer: MEDICARE

## 2025-05-15 ENCOUNTER — NON-APPOINTMENT (OUTPATIENT)
Age: 70
End: 2025-05-15

## 2025-05-15 VITALS
TEMPERATURE: 97.6 F | DIASTOLIC BLOOD PRESSURE: 98 MMHG | WEIGHT: 293 LBS | BODY MASS INDEX: 47.09 KG/M2 | HEIGHT: 66 IN | OXYGEN SATURATION: 96 % | SYSTOLIC BLOOD PRESSURE: 188 MMHG | HEART RATE: 113 BPM

## 2025-05-15 DIAGNOSIS — N88.2 STRICTURE AND STENOSIS OF CERVIX UTERI: ICD-10-CM

## 2025-05-15 DIAGNOSIS — R93.89 ABNORMAL FINDINGS ON DIAGNOSTIC IMAGING OF OTHER SPECIFIED BODY STRUCTURES: ICD-10-CM

## 2025-05-15 PROCEDURE — 99205 OFFICE O/P NEW HI 60 MIN: CPT

## 2025-05-15 PROCEDURE — 99459 PELVIC EXAMINATION: CPT | Mod: NC

## 2025-05-21 ENCOUNTER — APPOINTMENT (OUTPATIENT)
Dept: ULTRASOUND IMAGING | Facility: CLINIC | Age: 70
End: 2025-05-21
Payer: MEDICARE

## 2025-05-21 PROCEDURE — 76830 TRANSVAGINAL US NON-OB: CPT

## 2025-05-22 ENCOUNTER — OUTPATIENT (OUTPATIENT)
Dept: OUTPATIENT SERVICES | Facility: HOSPITAL | Age: 70
LOS: 1 days | End: 2025-05-22

## 2025-05-22 VITALS
DIASTOLIC BLOOD PRESSURE: 82 MMHG | RESPIRATION RATE: 18 BRPM | HEART RATE: 92 BPM | SYSTOLIC BLOOD PRESSURE: 150 MMHG | OXYGEN SATURATION: 97 % | WEIGHT: 293 LBS | TEMPERATURE: 98 F | HEIGHT: 64 IN

## 2025-05-22 DIAGNOSIS — E66.01 MORBID (SEVERE) OBESITY DUE TO EXCESS CALORIES: ICD-10-CM

## 2025-05-22 DIAGNOSIS — N88.2 STRICTURE AND STENOSIS OF CERVIX UTERI: ICD-10-CM

## 2025-05-22 DIAGNOSIS — Z98.890 OTHER SPECIFIED POSTPROCEDURAL STATES: Chronic | ICD-10-CM

## 2025-05-22 DIAGNOSIS — I10 ESSENTIAL (PRIMARY) HYPERTENSION: ICD-10-CM

## 2025-05-22 DIAGNOSIS — G47.33 OBSTRUCTIVE SLEEP APNEA (ADULT) (PEDIATRIC): ICD-10-CM

## 2025-05-22 DIAGNOSIS — E11.9 TYPE 2 DIABETES MELLITUS WITHOUT COMPLICATIONS: ICD-10-CM

## 2025-05-22 NOTE — H&P PST ADULT - OTHER CARE PROVIDERS
Cardiologist: Dr. Ac Hopkins [phone]                                                                                                                                               Pulm: Dr. Jeromy Iglesias [phone]

## 2025-05-22 NOTE — H&P PST ADULT - PROBLEM SELECTOR PLAN 4
Patient instructed to take Valsartan and Hydralazine with a sip of water on the morning of procedure. Instructed to hold Spironolactone and Hydrochlorothiazide on the morning of the procedure. Patient takes Aspirin for primary prevention, instructed to hold one week prior to procedure, reports she stopped taking on 5/19/25.

## 2025-05-22 NOTE — H&P PST ADULT - PROBLEM SELECTOR PLAN 2
Instructed to hold Metformin on the morning of the procedure. A1C 6.9% from 5/16/25. Accu-Chek ordered for day of procedure.

## 2025-05-22 NOTE — H&P PST ADULT - FUNCTIONAL STATUS
METS 5.07 - able to participate in light housework, chronic NICOLE with minimal activity, able to walk about 20 feet before becoming short of breath - ambulates with cane

## 2025-05-22 NOTE — H&P PST ADULT - NSICDXPASTSURGICALHX_GEN_ALL_CORE_FT
PAST SURGICAL HISTORY:  Cellulitis h/o   both legs    H/O  section  &     H/O dilation and curettage     History of tonsillectomy     S/P colonoscopy 2013    S/P D&C 2012    Tubal ligation status

## 2025-05-22 NOTE — H&P PST ADULT - HISTORY OF PRESENT ILLNESS
69 year old female with history of DM type 2, asthma/COPD, HTN, HLD, morbid obesity, CHICA with history of thickened endometrium and uterine cervical stenosis. Endometrial biopsy attempted unsuccessfully in the office and she is now scheduled for Diagnostic Hysteroscopy, Dilation and Curettage, possible polypectomy.

## 2025-05-22 NOTE — H&P PST ADULT - RESPIRATORY AND THORAX COMMENTS
hx of asthma/COPD - denies recent exacerbations or hospitalizations, last use of rescue inhaler 6 months ago; PFT - FEV1/FVC 75, 95% of predicted. mild obstructive airway disease. - Jan 2025

## 2025-05-22 NOTE — H&P PST ADULT - PROBLEM SELECTOR PLAN 1
Patient is tentatively scheduled for Diagnostic Hysteroscopy, Dilation and Curettage, possible polypectomy on 5/30/25. Pre-op instructions provided to patient. Patient given verbal and written instructions on Patient verbalized understanding.    H/H stable 13.5/42.4 from 5/16/25 (reviewed on pt portal)  EKG requested for baseline (borderline METS)

## 2025-05-22 NOTE — H&P PST ADULT - NSICDXPASTMEDICALHX_GEN_ALL_CORE_FT
PAST MEDICAL HISTORY:  Asthma     Bilateral carpal tunnel syndrome     DM (diabetes mellitus)     HTN (hypertension)     Obesity, morbid     CHICA (obstructive sleep apnea) "Advised to use machine & couldn't afford get one"    Peripheral neuropathy     Postmenopausal bleeding     Seasonal allergies     Stricture and stenosis of cervix uteri     Uterine polyp

## 2025-05-27 ENCOUNTER — APPOINTMENT (OUTPATIENT)
Age: 70
End: 2025-05-27
Payer: MEDICARE

## 2025-05-27 VITALS
SYSTOLIC BLOOD PRESSURE: 144 MMHG | WEIGHT: 293 LBS | HEIGHT: 66 IN | HEART RATE: 110 BPM | BODY MASS INDEX: 47.09 KG/M2 | RESPIRATION RATE: 16 BRPM | DIASTOLIC BLOOD PRESSURE: 71 MMHG | OXYGEN SATURATION: 97 % | TEMPERATURE: 97.4 F

## 2025-05-27 DIAGNOSIS — E66.01 MORBID (SEVERE) OBESITY DUE TO EXCESS CALORIES: ICD-10-CM

## 2025-05-27 DIAGNOSIS — J44.9 CHRONIC OBSTRUCTIVE PULMONARY DISEASE, UNSPECIFIED: ICD-10-CM

## 2025-05-27 DIAGNOSIS — G47.33 OBSTRUCTIVE SLEEP APNEA (ADULT) (PEDIATRIC): ICD-10-CM

## 2025-05-27 PROCEDURE — 99214 OFFICE O/P EST MOD 30 MIN: CPT

## 2025-05-29 NOTE — ASU PATIENT PROFILE, ADULT - FALL HARM RISK - RISK INTERVENTIONS

## 2025-05-30 ENCOUNTER — TRANSCRIPTION ENCOUNTER (OUTPATIENT)
Age: 70
End: 2025-05-30

## 2025-05-30 ENCOUNTER — RESULT REVIEW (OUTPATIENT)
Age: 70
End: 2025-05-30

## 2025-05-30 ENCOUNTER — APPOINTMENT (OUTPATIENT)
Dept: GYNECOLOGIC ONCOLOGY | Facility: HOSPITAL | Age: 70
End: 2025-05-30

## 2025-05-30 ENCOUNTER — OUTPATIENT (OUTPATIENT)
Dept: INPATIENT UNIT | Facility: HOSPITAL | Age: 70
LOS: 1 days | End: 2025-05-30
Payer: MEDICARE

## 2025-05-30 VITALS — OXYGEN SATURATION: 97 % | RESPIRATION RATE: 16 BRPM | HEART RATE: 72 BPM

## 2025-05-30 VITALS
OXYGEN SATURATION: 100 % | HEART RATE: 86 BPM | WEIGHT: 293 LBS | TEMPERATURE: 98 F | DIASTOLIC BLOOD PRESSURE: 50 MMHG | RESPIRATION RATE: 14 BRPM | SYSTOLIC BLOOD PRESSURE: 135 MMHG | HEIGHT: 64 IN

## 2025-05-30 DIAGNOSIS — N88.2 STRICTURE AND STENOSIS OF CERVIX UTERI: ICD-10-CM

## 2025-05-30 DIAGNOSIS — Z98.890 OTHER SPECIFIED POSTPROCEDURAL STATES: Chronic | ICD-10-CM

## 2025-05-30 LAB — GLUCOSE BLDC GLUCOMTR-MCNC: 124 MG/DL — HIGH (ref 70–99)

## 2025-05-30 PROCEDURE — 88305 TISSUE EXAM BY PATHOLOGIST: CPT | Mod: 26

## 2025-05-30 PROCEDURE — 58558 HYSTEROSCOPY BIOPSY: CPT

## 2025-05-30 RX ORDER — HYDROCHLOROTHIAZIDE 50 MG/1
1 TABLET ORAL
Refills: 0 | DISCHARGE

## 2025-05-30 RX ORDER — SODIUM CHLORIDE 9 G/1000ML
1000 INJECTION, SOLUTION INTRAVENOUS
Refills: 0 | Status: ACTIVE | OUTPATIENT
Start: 2025-05-30 | End: 2026-04-28

## 2025-05-30 RX ORDER — FLUTICASONE FUROATE, UMECLIDINIUM BROMIDE AND VILANTEROL TRIFENATATE 100; 62.5; 25 UG/1; UG/1; UG/1
1 POWDER RESPIRATORY (INHALATION)
Refills: 0 | DISCHARGE

## 2025-05-30 RX ORDER — ALBUTEROL SULFATE 2.5 MG/3ML
2 VIAL, NEBULIZER (ML) INHALATION
Refills: 0 | DISCHARGE

## 2025-05-30 RX ORDER — SPIRONOLACTONE 25 MG
1 TABLET ORAL
Refills: 0 | DISCHARGE

## 2025-05-30 RX ORDER — ONDANSETRON HCL/PF 4 MG/2 ML
4 VIAL (ML) INJECTION ONCE
Refills: 0 | Status: COMPLETED | OUTPATIENT
Start: 2025-05-30 | End: 2025-05-30

## 2025-05-30 RX ORDER — KETOROLAC TROMETHAMINE 30 MG/ML
15 INJECTION, SOLUTION INTRAMUSCULAR; INTRAVENOUS ONCE
Refills: 0 | Status: DISCONTINUED | OUTPATIENT
Start: 2025-05-30 | End: 2025-05-30

## 2025-05-30 RX ORDER — METFORMIN HYDROCHLORIDE 850 MG/1
1 TABLET ORAL
Refills: 0 | DISCHARGE

## 2025-05-30 RX ORDER — HYDROMORPHONE/SOD CHLOR,ISO/PF 2 MG/10 ML
0.5 SYRINGE (ML) INJECTION
Refills: 0 | Status: DISCONTINUED | OUTPATIENT
Start: 2025-05-30 | End: 2025-05-30

## 2025-05-30 RX ADMIN — Medication 4 MILLIGRAM(S): at 10:11

## 2025-05-30 RX ADMIN — KETOROLAC TROMETHAMINE 15 MILLIGRAM(S): 30 INJECTION, SOLUTION INTRAMUSCULAR; INTRAVENOUS at 10:31

## 2025-05-30 RX ADMIN — KETOROLAC TROMETHAMINE 15 MILLIGRAM(S): 30 INJECTION, SOLUTION INTRAMUSCULAR; INTRAVENOUS at 10:48

## 2025-05-30 NOTE — ASU DISCHARGE PLAN (ADULT/PEDIATRIC) - CARE PROVIDER_API CALL
Cassi Sosa NP in [facility]  [address] [place] [id number]  Phone: [phone]  Fax: [phone]  Established Patient  Scheduled Appointment: 06/12/2025 11:30 AM

## 2025-05-30 NOTE — ASU DISCHARGE PLAN (ADULT/PEDIATRIC) - MEDICATION INSTRUCTIONS
You can take up to 600mg Ibuprofen every 6 hours and/or tylenol 975mg every 6 hours. You have received Tyelneol/Acetaminophen during your hospital stay. The max amount of Tylenol daily is 4000MG. Please keep that in mind if you are taking other prescription or over the Counter pain medications or cold Medicine. The next time that you can take tylenol is at 2:30PM. You can then start taking it every 6 hours as needed for pain and alternate with ibuprofen

## 2025-05-30 NOTE — BRIEF OPERATIVE NOTE - NSICDXBRIEFPREOP_GEN_ALL_CORE_FT
PRE-OP DIAGNOSIS:  Thickened endometrium 30-May-2025 09:24:37  Lisa Sanchez  Cervical os stenosis 30-May-2025 09:25:09  Lisa Sanchez

## 2025-05-30 NOTE — BRIEF OPERATIVE NOTE - NSICDXBRIEFPOSTOP_GEN_ALL_CORE_FT
POST-OP DIAGNOSIS:  Thickened endometrium 30-May-2025 09:25:22  Lisa Sanchez  Cervical os stenosis 30-May-2025 09:25:37  Lisa Sanchez

## 2025-05-30 NOTE — ASU DISCHARGE PLAN (ADULT/PEDIATRIC) - ASU DC SPECIAL INSTRUCTIONSFT
Postoperative Instructions    For pain control, take the followin. Ibuprofen 600mg every 6 hours, take with food  2. Add Tylenol 975mg every 6 hours, alternated with ibuprofen  Tylenol and ibuprofen may be obtained over the counter.    Return to your regular way of eating.     Resume normal activity as tolerated, but no heavy lifting or strenuous activity for 6 weeks. Complete vaginal rest, no tampons, no douching, no tub bathing, no sexual activities for 6 weeks unless otherwise instructed by your doctor.      No driving while on narcotic pain medication.      Call your doctor with any signs and symptoms of infection such as fever (>100.4 F), chills, nausea or vomiting.  Call your doctor if you're unable to tolerate food or have difficulty urinating.  Call your doctor if you have pain that is not relieved by your prescribed medications. Call your doctor with redness or swelling at the incision site, fluid leakage or wound separation.    Notify your doctor with any other concerns. Follow up with your doctor in 2 weeks for a post-operative appointment.

## 2025-05-30 NOTE — ASU DISCHARGE PLAN (ADULT/PEDIATRIC) - PROVIDER TOKENS
PROVIDER:[TOKEN:[[id number]],SCHEDULEDAPPT:[06/12/2025],SCHEDULEDAPPTTIME:[11:30 AM],ESTABLISHEDPATIENT:[T]]

## 2025-05-30 NOTE — ASU DISCHARGE PLAN (ADULT/PEDIATRIC) - FINANCIAL ASSISTANCE
[facility] provides services at a reduced cost to those who are determined to be eligible through [facility]’s financial assistance program. Information regarding [facility]’s financial assistance program can be found by going to [email or web address] or by calling [phone].

## 2025-05-30 NOTE — BRIEF OPERATIVE NOTE - OPERATION/FINDINGS
EUA: atrophic external genitalia, anterior and posterior vaginal prolapse visualized. Cervix grossly normal appearing. HSC: uterine cavity with atrophic endometrium throughout and polypoid tissue on posterior uterine wall visualized. Bilateral ostia visualized and normal appearing. Uterine fundus sounded to approximately 12 cm. Cervix sounded to approximately 6-7 cm. EUA: atrophic external genitalia, anterior and posterior vaginal prolapse visualized. Cervix grossly normal appearing. HSC: uterine cavity with atrophic endometrium throughout and polypoid tissue on posterior uterine wall visualized. Bilateral ostia visualized and normal appearing. Uterine fundus sounded to approximately 12 cm. Cervix sounded to approximately 6-7 cm with a stenotic internal os.

## 2025-05-30 NOTE — ASU DISCHARGE PLAN (ADULT/PEDIATRIC) - NS MD DC FALL RISK RISK
For information on Fall & Injury Prevention, visit: [email or web address] OR  [email or web address] OR  https://www.cdc.gov/steadi/patient.html

## 2025-05-30 NOTE — BRIEF OPERATIVE NOTE - PRIMARY SURGEON
How Severe Is Your Condition?: mild StephanNamrata Rahman (Attending) <<----- Click to Select Surgeon

## 2025-05-30 NOTE — ASU PREOP CHECKLIST - COMMENTS
sip of water with valsartan, famotidine and hydralazine @ 5am
Detail Level: Simple
Instructions: This plan will send the code FBSD to the PM system.  DO NOT or CHANGE the price.
Price (Do Not Change): 0.00

## 2025-05-30 NOTE — BRIEF OPERATIVE NOTE - NSICDXBRIEFPROCEDURE_GEN_ALL_CORE_FT
PROCEDURES:  Diagnostic hysteroscopy with dilation and curettage of uterus 30-May-2025 09:24:24  Lisa Sanchez

## 2025-06-03 ENCOUNTER — NON-APPOINTMENT (OUTPATIENT)
Age: 70
End: 2025-06-03

## 2025-06-04 LAB — SURGICAL PATHOLOGY STUDY: SIGNIFICANT CHANGE UP

## 2025-06-12 ENCOUNTER — APPOINTMENT (OUTPATIENT)
Dept: GYNECOLOGIC ONCOLOGY | Facility: CLINIC | Age: 70
End: 2025-06-12
Payer: MEDICARE

## 2025-06-12 VITALS
HEIGHT: 66 IN | OXYGEN SATURATION: 95 % | TEMPERATURE: 98.4 F | BODY MASS INDEX: 47.09 KG/M2 | HEART RATE: 101 BPM | SYSTOLIC BLOOD PRESSURE: 184 MMHG | WEIGHT: 293 LBS | DIASTOLIC BLOOD PRESSURE: 88 MMHG

## 2025-06-12 PROCEDURE — 99213 OFFICE O/P EST LOW 20 MIN: CPT

## 2025-06-17 PROBLEM — N88.2 STRICTURE AND STENOSIS OF CERVIX UTERI: Chronic | Status: ACTIVE | Noted: 2025-05-22

## 2025-06-17 PROBLEM — G62.9 POLYNEUROPATHY, UNSPECIFIED: Chronic | Status: ACTIVE | Noted: 2025-05-22

## 2025-06-18 NOTE — PHYSICAL THERAPY INITIAL EVALUATION ADULT - ASR WT BEARING STATUS EVAL
[FreeTextEntry1] : 74 year old female with PMHx of HTN, HLD, DM, renal cell carcinoma dx >10 yrs ago s/p renal mass resection (follows with onc Dr. Wiley- remains on treatment per him), who presented to [facility] ED 6/13/25 s/p fall with head strike on her nightstand after dizziness episode during the night when getting up to use the bathroom. CTA showed chronic venous thrombosis, pt was started on aspirin per stroke team, and CTH showed 1.8 x 0.8cm small enhancing dural lesion along R parasagittal falx. Admitted to medicine due to persistent dizziness. MRI without contrast performed showing redemonstrated small extra-axial right posterior parafalcine lesion. Neurosurgery consulted during admission and recommended outpatient follow- up.   Presents TODAY for evaluation.  She endorses post fall left periorbital swelling and forehead hematoma have been improving.  She remains on aspirin daily per discharge instructions.  Denies headaches, other dizziness episodes, other new/worsening focal neurological deficits.  Of note- she had left ear pain on flight coming home from [place] and then a few days later noticed blood her in her left ear.   Oncologist:  Jorge Wiley [address] [place] [id number]   [phone] [email or web address] no weight-bearing restrictions

## (undated) DEVICE — GOWN LG

## (undated) DEVICE — ELCTR CUTTING LOOP 24FR RIGHT ANGLE

## (undated) DEVICE — FOLEY TRAY 16FR LF URINE METER SURESTEP

## (undated) DEVICE — GLV 7.5 PROTEXIS (WHITE)

## (undated) DEVICE — DRAPE LEGGINGS 6" CUFF 28X43"

## (undated) DEVICE — CABLE DAC ACTIVE CORD

## (undated) DEVICE — DRAPE 1/2 SHEET 40X57"

## (undated) DEVICE — PACK MAJOR CHEST BREAST

## (undated) DEVICE — DRAPE INSTRUMENT POUCH 6.75" X 11"

## (undated) DEVICE — ELCTR CUTTING LOOP 24FR 12 DEG 0.35 WIRE

## (undated) DEVICE — GLV 6 PROTEXIS (WHITE)

## (undated) DEVICE — WARMING BLANKET UPPER ADULT

## (undated) DEVICE — LAP PAD 18 X 18"

## (undated) DEVICE — VENODYNE/SCD SLEEVE CALF MEDIUM

## (undated) DEVICE — SOL IRR POUR NS 0.9% 1500ML

## (undated) DEVICE — DRAPE TOWEL BLUE 17" X 24"

## (undated) DEVICE — STAPLER SKIN PROXIMATE ROTATING WIDE

## (undated) DEVICE — DRAPE MAYO STAND 23"

## (undated) DEVICE — LAP PAD W RING 18 X 18"

## (undated) DEVICE — DRAPE WARMING SOLUTION 44 X 44"

## (undated) DEVICE — SOL IRR POUR NS 0.9% 500ML

## (undated) DEVICE — GOWN XL

## (undated) DEVICE — CANISTER SUCTION 2000CC

## (undated) DEVICE — DRAPE LAVH 124" X 30" X125"

## (undated) DEVICE — DRAPE LIGHT HANDLE COVER (BLUE)

## (undated) DEVICE — BASIN SET SINGLE

## (undated) DEVICE — PACK D&C

## (undated) DEVICE — GLV 6.5 PROTEXIS (BLUE)

## (undated) DEVICE — SOL IRR POUR H2O 500ML

## (undated) DEVICE — SOL IRR POUR H2O 250ML

## (undated) DEVICE — DRAPE 3/4 SHEET 52X76"

## (undated) DEVICE — PRESSURE INFUSOR BAG 1000ML

## (undated) DEVICE — SOL ANTI FOG (FRED)

## (undated) DEVICE — DRAPE UNDER BUTTOCKS W SCREEN

## (undated) DEVICE — MYOSURE SCOPE SEAL

## (undated) DEVICE — SPECIMEN CONTAINER 100ML

## (undated) DEVICE — PREP BETADINE KIT

## (undated) DEVICE — PACK PERI GYN

## (undated) DEVICE — ELCTR BOVIE PENCIL SMOKE EVACUATION

## (undated) DEVICE — DRSG TELFA 3 X 8

## (undated) DEVICE — TUBING SUCTION 20FT

## (undated) DEVICE — POSITIONER FOAM EGG CRATE ULNAR 2PCS (PINK)